# Patient Record
Sex: MALE | Race: WHITE | NOT HISPANIC OR LATINO | Employment: OTHER | URBAN - NONMETROPOLITAN AREA
[De-identification: names, ages, dates, MRNs, and addresses within clinical notes are randomized per-mention and may not be internally consistent; named-entity substitution may affect disease eponyms.]

---

## 2017-01-16 ENCOUNTER — OFFICE VISIT (OUTPATIENT)
Dept: UROLOGY | Facility: CLINIC | Age: 77
End: 2017-01-16

## 2017-01-16 VITALS — HEIGHT: 71 IN | TEMPERATURE: 95.9 F | WEIGHT: 241 LBS | BODY MASS INDEX: 33.74 KG/M2

## 2017-01-16 DIAGNOSIS — N40.1 BPH (BENIGN PROSTATIC HYPERTROPHY) WITH URINARY OBSTRUCTION: Primary | ICD-10-CM

## 2017-01-16 DIAGNOSIS — N28.1 RENAL CYST: ICD-10-CM

## 2017-01-16 DIAGNOSIS — N13.8 BPH (BENIGN PROSTATIC HYPERTROPHY) WITH URINARY OBSTRUCTION: Primary | ICD-10-CM

## 2017-01-16 DIAGNOSIS — Z12.5 ENCOUNTER FOR SCREENING FOR MALIGNANT NEOPLASM OF PROSTATE: ICD-10-CM

## 2017-01-16 LAB
BILIRUB BLD-MCNC: NEGATIVE MG/DL
CLARITY, POC: CLEAR
COLOR UR: YELLOW
GLUCOSE UR STRIP-MCNC: NEGATIVE MG/DL
KETONES UR QL: NEGATIVE
LEUKOCYTE EST, POC: NEGATIVE
NITRITE UR-MCNC: NEGATIVE MG/ML
PH UR: 7 [PH] (ref 5–8)
PROT UR STRIP-MCNC: NEGATIVE MG/DL
RBC # UR STRIP: NEGATIVE /UL
SP GR UR: 1.02 (ref 1–1.03)
UROBILINOGEN UR QL: NORMAL

## 2017-01-16 PROCEDURE — 81003 URINALYSIS AUTO W/O SCOPE: CPT | Performed by: UROLOGY

## 2017-01-16 PROCEDURE — 99213 OFFICE O/P EST LOW 20 MIN: CPT | Performed by: UROLOGY

## 2017-01-16 NOTE — MR AVS SNAPSHOT
Garry Short   1/16/2017 1:10 PM   Office Visit    Dept Phone:  [phone]   Encounter #:  [id number]    Provider:  Phong Arrieta MD   Department:  [facility] UROLOGY                Your Full Care Plan              Your Updated Medication List          This list is accurate as of: 1/16/17  1:43 PM.  Always use your most recent med list.                AMBIEN CR 12.5 MG CR tablet   Generic drug:  zolpidem CR       amLODIPine 10 MG tablet   Commonly known as:  NORVASC       aspirin 81 MG EC tablet       FLUoxetine 20 MG capsule   Commonly known as:  PROzac       hydrochlorothiazide 25 MG tablet   Commonly known as:  HYDRODIURIL       levoFLOXacin 500 MG tablet   Commonly known as:  LEVAQUIN       losartan-hydrochlorothiazide 100-25 MG per tablet   Commonly known as:  HYZAAR       metroNIDAZOLE 500 MG tablet   Commonly known as:  FLAGYL       naproxen 500 MG tablet   Commonly known as:  NAPROSYN       nebivolol 10 MG tablet   Commonly known as:  BYSTOLIC       Potassium 99 MG tablet       rosuvastatin 10 MG tablet   Commonly known as:  CRESTOR       STRESSTABS ENERGY PO       tamsulosin 0.4 MG capsule 24 hr capsule   Commonly known as:  FLOMAX   Take 1 capsule by mouth Every Night.               We Performed the Following     POC Urinalysis Dipstick, Automated       You Were Diagnosed With        Codes Comments    BPH (benign prostatic hypertrophy) with urinary obstruction    -  Primary ICD-10-CM: N40.1, N13.8  ICD-9-CM: 600.01, 599.69     Renal cyst     ICD-10-CM: N28.1  ICD-9-CM: 753.10     Encounter for screening for malignant neoplasm of prostate     ICD-10-CM: Z12.5  ICD-9-CM: V76.44       Instructions     None    Patient Instructions History      Upcoming Appointments     Visit Type Date Time Department    FOLLOW UP 1/16/2017  1:10 PM [facility] UROLOGY [facility]      MyChart Signup     [facility] that [facility]. If you  "would like to sign up for [employer], please email [email or web address] or call [phone] to obtain an activation code.             Other Info from Your Visit           Allergies     Acetaminophen      Tylenol 3    Daypro [Oxaprozin]      Procardia [Nifedipine]      Relafen [Nabumetone]      Septra [Sulfamethoxazole-trimethoprim]      Sulfa Antibiotics      Zestril [Lisinopril]        Reason for Visit     Benign Prostatic Hypertrophy           Vital Signs     Temperature Height Weight Body Mass Index Smoking Status       95.9 °F (35.5 °C) 71\" (180.3 cm) 241 lb (109 kg) 33.61 kg/m2 Never Smoker       Problems and Diagnoses Noted     Enlarged prostate with urinary obstruction    -  Primary    Renal cyst        Screening for prostate cancer          Results     POC Urinalysis Dipstick, Automated      Component Value Standard Range & Units    Color Yellow Yellow, Straw, Dark Yellow, Mojgan    Clarity, UA Clear Clear    Glucose, UA Negative Negative, 1000 mg/dL (3+) mg/dL    Bilirubin Negative Negative    Ketones, UA Negative Negative    Specific Gravity  1.025 1.005 - 1.030    Blood, UA Negative Negative    pH, Urine 7.0 5.0 - 8.0    Protein, POC Negative Negative mg/dL    Urobilinogen, UA Normal Normal    Leukocytes Negative Negative    Nitrite, UA Negative Negative                    "

## 2017-01-16 NOTE — PROGRESS NOTES
Subjective    Mr. Short is 76 y.o. male    Chief Complaint: BPH    History of Present Illness    Benign Prostatic Hypertrophy  Patient complains of lower urinary tract symptoms. He reports frequency, incomplete emptying, intermittency, nocturia two times a night, straining and weak stream. He denies urgency. Patient states symptoms are of mild severity. Onset of symptoms was several months ago and was gradual in onset. His AUA Symptom Score is, 10/35.He reports a history of no complicating symptoms. He denies flank pain, gross hematuria, kidney stones and recurrent UTI.  Patient has tried Alpha blockers with improvement. Last PSA was not drawn .            The following portions of the patient's history were reviewed and updated as appropriate: allergies, current medications, past family history, past medical history, past social history, past surgical history and problem list.    Review of Systems   Constitutional: Negative for chills and fever.   Gastrointestinal: Negative for abdominal pain, anal bleeding and blood in stool.   Genitourinary: Negative for flank pain and hematuria.         Current Outpatient Prescriptions:   •  amLODIPine (NORVASC) 10 MG tablet, Take 10 mg by mouth Daily., Disp: , Rfl:   •  aspirin 81 MG EC tablet, Take 81 mg by mouth Daily., Disp: , Rfl:   •  FLUoxetine (PROzac) 20 MG capsule, Take 20 mg by mouth Daily., Disp: , Rfl:   •  hydrochlorothiazide (HYDRODIURIL) 25 MG tablet, Take 25 mg by mouth Daily., Disp: , Rfl:   •  levoFLOXacin (LEVAQUIN) 500 MG tablet, Take 500 mg by mouth Daily., Disp: , Rfl:   •  losartan-hydrochlorothiazide (HYZAAR) 100-25 MG per tablet, Take 1 tablet by mouth Daily., Disp: , Rfl:   •  metroNIDAZOLE (FLAGYL) 500 MG tablet, Take 500 mg by mouth 2 (Two) Times a Day., Disp: , Rfl:   •  Multiple Vitamin (STRESSTABS ENERGY PO), Take 1 tablet by mouth Daily., Disp: , Rfl:   •  naproxen (NAPROSYN) 500 MG tablet, Take 500 mg by mouth 2 (Two) Times a Day With  "Meals., Disp: , Rfl:   •  nebivolol (BYSTOLIC) 10 MG tablet, Take 10 mg by mouth Daily., Disp: , Rfl:   •  Potassium 99 MG tablet, Take 1 tablet by mouth Daily., Disp: , Rfl:   •  rosuvastatin (CRESTOR) 10 MG tablet, Take 10 mg by mouth Daily., Disp: , Rfl:   •  tamsulosin (FLOMAX) 0.4 MG capsule 24 hr capsule, Take 1 capsule by mouth Every Night., Disp: 90 capsule, Rfl: 3  •  zolpidem CR (AMBIEN CR) 12.5 MG CR tablet, Take 12.5 mg by mouth At Night As Needed for sleep., Disp: , Rfl:     Past Medical History   Diagnosis Date   • Hyperlipidemia    • Hypertension        Past Surgical History   Procedure Laterality Date   • Knee surgery Left    • Carpal tunnel release     • Laminectomy     • Spinal fusion         Social History     Social History   • Marital status:      Spouse name: N/A   • Number of children: N/A   • Years of education: N/A     Social History Main Topics   • Smoking status: Never Smoker   • Smokeless tobacco: None   • Alcohol use Yes      Comment: occ   • Drug use: None   • Sexual activity: Not Asked     Other Topics Concern   • None     Social History Narrative       Family History   Problem Relation Age of Onset   • No Known Problems Father    • No Known Problems Mother        Objective    Visit Vitals   • Temp 95.9 °F (35.5 °C)   • Ht 71\" (180.3 cm)   • Wt 241 lb (109 kg)   • BMI 33.61 kg/m2       Physical Exam        Results for orders placed or performed in visit on 01/16/17   POC Urinalysis Dipstick, Automated   Result Value Ref Range    Color Yellow Yellow, Straw, Dark Yellow, Mojgan    Clarity, UA Clear Clear    Glucose, UA Negative Negative, 1000 mg/dL (3+) mg/dL    Bilirubin Negative Negative    Ketones, UA Negative Negative    Specific Gravity  1.025 1.005 - 1.030    Blood, UA Negative Negative    pH, Urine 7.0 5.0 - 8.0    Protein, POC Negative Negative mg/dL    Urobilinogen, UA Normal Normal    Leukocytes Negative Negative    Nitrite, UA Negative Negative     Assessment and " Veronica Castañeda was seen today for benign prostatic hypertrophy.    Diagnoses and all orders for this visit:    BPH (benign prostatic hypertrophy) with urinary obstruction  -     POC Urinalysis Dipstick, Automated  -     PSA; Future    Renal cyst  -     US Renal Bilateral; Future    Encounter for screening for malignant neoplasm of prostate   -     PSA; Future      Patient much improved on Flomax.  I will keep an eye on his Bosniak one cyst with a renal ultrasound next year.  We will also check a PSA next year.  Continue Flomax.  No need for invasive testing at this point.

## 2017-01-16 NOTE — LETTER
January 16, 2017     Jabier Dubois MD  [address] [facility] [address]    Patient: Garry Short   YOB: 1940   Date of Visit: 1/16/2017       Dear Dr. Silvino MD:    Thank you for referring Garry Short to me for evaluation. Below are the relevant portions of my assessment and plan of care.    If you have questions, please do not hesitate to call me. I look forward to following Garry along with you.         Sincerely,        Phong Arrieta MD        CC: No Recipients  Phong Arrieta MD  1/16/2017  1:43 PM  Signed  Subjective    Mr. Short is 76 y.o. male    Chief Complaint: BPH    History of Present Illness    Benign Prostatic Hypertrophy  Patient complains of lower urinary tract symptoms. He reports frequency, incomplete emptying, intermittency, nocturia two times a night, straining and weak stream. He denies urgency. Patient states symptoms are of mild severity. Onset of symptoms was several months ago and was gradual in onset. His AUA Symptom Score is, 10/35.He reports a history of no complicating symptoms. He denies flank pain, gross hematuria, kidney stones and recurrent UTI.  Patient has tried Alpha blockers with improvement. Last PSA was not drawn .            The following portions of the patient's history were reviewed and updated as appropriate: allergies, current medications, past family history, past medical history, past social history, past surgical history and problem list.    Review of Systems   Constitutional: Negative for chills and fever.   Gastrointestinal: Negative for abdominal pain, anal bleeding and blood in stool.   Genitourinary: Negative for flank pain and hematuria.         Current Outpatient Prescriptions:   •  amLODIPine (NORVASC) 10 MG tablet, Take 10 mg by mouth Daily., Disp: , Rfl:   •  aspirin 81 MG EC tablet, Take 81 mg by mouth Daily., Disp: , Rfl:   •  FLUoxetine (PROzac) 20 MG capsule, Take 20 mg by mouth Daily., Disp: , Rfl:  "  •  hydrochlorothiazide (HYDRODIURIL) 25 MG tablet, Take 25 mg by mouth Daily., Disp: , Rfl:   •  levoFLOXacin (LEVAQUIN) 500 MG tablet, Take 500 mg by mouth Daily., Disp: , Rfl:   •  losartan-hydrochlorothiazide (HYZAAR) 100-25 MG per tablet, Take 1 tablet by mouth Daily., Disp: , Rfl:   •  metroNIDAZOLE (FLAGYL) 500 MG tablet, Take 500 mg by mouth 2 (Two) Times a Day., Disp: , Rfl:   •  Multiple Vitamin (STRESSTABS ENERGY PO), Take 1 tablet by mouth Daily., Disp: , Rfl:   •  naproxen (NAPROSYN) 500 MG tablet, Take 500 mg by mouth 2 (Two) Times a Day With Meals., Disp: , Rfl:   •  nebivolol (BYSTOLIC) 10 MG tablet, Take 10 mg by mouth Daily., Disp: , Rfl:   •  Potassium 99 MG tablet, Take 1 tablet by mouth Daily., Disp: , Rfl:   •  rosuvastatin (CRESTOR) 10 MG tablet, Take 10 mg by mouth Daily., Disp: , Rfl:   •  tamsulosin (FLOMAX) 0.4 MG capsule 24 hr capsule, Take 1 capsule by mouth Every Night., Disp: 90 capsule, Rfl: 3  •  zolpidem CR (AMBIEN CR) 12.5 MG CR tablet, Take 12.5 mg by mouth At Night As Needed for sleep., Disp: , Rfl:     Past Medical History   Diagnosis Date   • Hyperlipidemia    • Hypertension        Past Surgical History   Procedure Laterality Date   • Knee surgery Left    • Carpal tunnel release     • Laminectomy     • Spinal fusion         Social History     Social History   • Marital status:      Spouse name: N/A   • Number of children: N/A   • Years of education: N/A     Social History Main Topics   • Smoking status: Never Smoker   • Smokeless tobacco: None   • Alcohol use Yes      Comment: occ   • Drug use: None   • Sexual activity: Not Asked     Other Topics Concern   • None     Social History Narrative       Family History   Problem Relation Age of Onset   • No Known Problems Father    • No Known Problems Mother        Objective    Visit Vitals   • Temp 95.9 °F (35.5 °C)   • Ht 71\" (180.3 cm)   • Wt 241 lb (109 kg)   • BMI 33.61 kg/m2       Physical Exam        Results for orders " placed or performed in visit on 01/16/17   POC Urinalysis Dipstick, Automated   Result Value Ref Range    Color Yellow Yellow, Straw, Dark Yellow, Mojgan    Clarity, UA Clear Clear    Glucose, UA Negative Negative, 1000 mg/dL (3+) mg/dL    Bilirubin Negative Negative    Ketones, UA Negative Negative    Specific Gravity  1.025 1.005 - 1.030    Blood, UA Negative Negative    pH, Urine 7.0 5.0 - 8.0    Protein, POC Negative Negative mg/dL    Urobilinogen, UA Normal Normal    Leukocytes Negative Negative    Nitrite, UA Negative Negative     Assessment and Plan    Garry was seen today for benign prostatic hypertrophy.    Diagnoses and all orders for this visit:    BPH (benign prostatic hypertrophy) with urinary obstruction  -     POC Urinalysis Dipstick, Automated  -     PSA; Future    Renal cyst  -     US Renal Bilateral; Future    Encounter for screening for malignant neoplasm of prostate   -     PSA; Future      Patient much improved on Flomax.  I will keep an eye on his Bosniak one cyst with a renal ultrasound next year.  We will also check a PSA next year.  Continue Flomax.  No need for invasive testing at this point.

## 2017-12-01 DIAGNOSIS — N13.30 HYDRONEPHROSIS, UNSPECIFIED HYDRONEPHROSIS TYPE: Primary | ICD-10-CM

## 2018-01-11 DIAGNOSIS — N13.8 BENIGN PROSTATIC HYPERPLASIA WITH URINARY OBSTRUCTION: ICD-10-CM

## 2018-01-11 DIAGNOSIS — Z12.5 ENCOUNTER FOR SCREENING FOR MALIGNANT NEOPLASM OF PROSTATE: ICD-10-CM

## 2018-01-11 DIAGNOSIS — N40.1 BENIGN PROSTATIC HYPERPLASIA WITH URINARY OBSTRUCTION: ICD-10-CM

## 2018-01-17 ENCOUNTER — HOSPITAL ENCOUNTER (OUTPATIENT)
Dept: ULTRASOUND IMAGING | Facility: HOSPITAL | Age: 78
Discharge: HOME | End: 2018-01-17
Attending: UROLOGY | Admitting: UROLOGY

## 2018-01-17 ENCOUNTER — OFFICE VISIT (OUTPATIENT)
Dept: UROLOGY | Facility: CLINIC | Age: 78
End: 2018-01-17

## 2018-01-17 VITALS — WEIGHT: 237.8 LBS | TEMPERATURE: 98.7 F | HEIGHT: 71 IN | BODY MASS INDEX: 33.29 KG/M2

## 2018-01-17 DIAGNOSIS — N13.8 BENIGN PROSTATIC HYPERPLASIA WITH URINARY OBSTRUCTION: Primary | ICD-10-CM

## 2018-01-17 DIAGNOSIS — N40.1 BENIGN PROSTATIC HYPERPLASIA WITH URINARY OBSTRUCTION: Primary | ICD-10-CM

## 2018-01-17 DIAGNOSIS — N28.1 RENAL CYST: ICD-10-CM

## 2018-01-17 DIAGNOSIS — N13.30 HYDRONEPHROSIS, UNSPECIFIED HYDRONEPHROSIS TYPE: ICD-10-CM

## 2018-01-17 LAB
BILIRUB BLD-MCNC: NEGATIVE MG/DL
CLARITY, POC: CLEAR
COLOR UR: YELLOW
GLUCOSE UR STRIP-MCNC: NEGATIVE MG/DL
KETONES UR QL: NEGATIVE
LEUKOCYTE EST, POC: NEGATIVE
NITRITE UR-MCNC: NEGATIVE MG/ML
PH UR: 7 [PH] (ref 5–8)
PROT UR STRIP-MCNC: ABNORMAL MG/DL
RBC # UR STRIP: NEGATIVE /UL
SP GR UR: 1.02 (ref 1–1.03)
UROBILINOGEN UR QL: NORMAL

## 2018-01-17 PROCEDURE — 81003 URINALYSIS AUTO W/O SCOPE: CPT | Performed by: UROLOGY

## 2018-01-17 PROCEDURE — 99213 OFFICE O/P EST LOW 20 MIN: CPT | Performed by: UROLOGY

## 2018-01-17 PROCEDURE — 76775 US EXAM ABDO BACK WALL LIM: CPT

## 2018-01-17 RX ORDER — MELOXICAM 15 MG/1
15 TABLET ORAL DAILY
COMMUNITY

## 2018-01-17 RX ORDER — FINASTERIDE 5 MG/1
5 TABLET, FILM COATED ORAL DAILY
COMMUNITY
End: 2019-02-08 | Stop reason: SDUPTHER

## 2018-01-17 NOTE — PROGRESS NOTES
Subjective    Mr. Short is 77 y.o. male    Chief Complaint: BPH    History of Present Illness     Benign Prostatic Hypertrophy  Patient complains of lower urinary tract symptoms. He reports frequency, incomplete emptying, intermittency, nocturia two times a night, straining and weak stream. He denies urgency. Patient states symptoms are of mild severity. Onset of symptoms was several months ago and was gradual in onset. His AUA Symptom Score is, 12/35.He reports a history of no complicating symptoms. He denies flank pain, gross hematuria, kidney stones and recurrent UTI.  Patient has tried Alpha blockers with improvement. Last PSA was not drawn .          The following portions of the patient's history were reviewed and updated as appropriate: allergies, current medications, past family history, past medical history, past social history, past surgical history and problem list.    Review of Systems   Constitutional: Negative for chills and fever.   Gastrointestinal: Negative for abdominal pain, anal bleeding and blood in stool.   Genitourinary: Negative for flank pain and hematuria.         Current Outpatient Prescriptions:   •  amLODIPine (NORVASC) 10 MG tablet, Take 10 mg by mouth Daily., Disp: , Rfl:   •  aspirin 81 MG EC tablet, Take 81 mg by mouth Daily., Disp: , Rfl:   •  finasteride (PROSCAR) 5 MG tablet, Take 5 mg by mouth Daily., Disp: , Rfl:   •  FLUoxetine (PROzac) 20 MG capsule, Take 20 mg by mouth Daily., Disp: , Rfl:   •  hydrochlorothiazide (HYDRODIURIL) 25 MG tablet, Take 25 mg by mouth Daily., Disp: , Rfl:   •  losartan-hydrochlorothiazide (HYZAAR) 100-25 MG per tablet, Take 1 tablet by mouth Daily., Disp: , Rfl:   •  meloxicam (MOBIC) 15 MG tablet, Take 15 mg by mouth Daily., Disp: , Rfl:   •  nebivolol (BYSTOLIC) 10 MG tablet, Take 10 mg by mouth Daily., Disp: , Rfl:   •  Potassium 99 MG tablet, Take 1 tablet by mouth Daily., Disp: , Rfl:   •  zolpidem CR (AMBIEN CR) 12.5 MG CR tablet, Take  "12.5 mg by mouth At Night As Needed for sleep., Disp: , Rfl:     Past Medical History:   Diagnosis Date   • Hyperlipidemia    • Hypertension        Past Surgical History:   Procedure Laterality Date   • CARPAL TUNNEL RELEASE     • KNEE SURGERY Left    • LAMINECTOMY     • SPINAL FUSION         Social History     Social History   • Marital status:      Spouse name: N/A   • Number of children: N/A   • Years of education: N/A     Social History Main Topics   • Smoking status: Never Smoker   • Smokeless tobacco: Never Used   • Alcohol use Yes      Comment: occ   • Drug use: None   • Sexual activity: Not Asked     Other Topics Concern   • None     Social History Narrative       Family History   Problem Relation Age of Onset   • No Known Problems Father    • No Known Problems Mother        Objective    Temp 98.7 °F (37.1 °C)  Ht 180.3 cm (71\")  Wt 108 kg (237 lb 12.8 oz)  BMI 33.17 kg/m2    Physical Exam   Constitutional: He is oriented to person, place, and time. He appears well-developed and well-nourished.   Pulmonary/Chest: Effort normal.   Abdominal: Soft. He exhibits no distension and no mass. There is no tenderness. There is no rebound and no guarding. No hernia.   Genitourinary: Penis normal. Rectal exam shows no mass, no tenderness and anal tone normal. Enlarged: for the age of the patient. Right testis shows no mass, no swelling and no tenderness. Left testis shows no mass, no swelling and no tenderness. No hypospadias. No discharge found.   Genitourinary Comments:  The urethral meatus normal in position without evidence of stricture. Epididymis without mass or tenderness. Vas Deferens is palpably normal.Anus and perineum without mass or tenderness. The prostate is approximately 35 ml. It is Symmetric, with a Soft consistency. There are no nodules present. . The seminal vesicles are Not palpable due to the size of the prostate.     Neurological: He is alert and oriented to person, place, and time. "   Vitals reviewed.           Results for orders placed or performed in visit on 01/17/18   POC Urinalysis Dipstick, Automated   Result Value Ref Range    Color Yellow Yellow, Straw, Dark Yellow, Mojgan    Clarity, UA Clear Clear    Glucose, UA Negative Negative, 1000 mg/dL (3+) mg/dL    Bilirubin Negative Negative    Ketones, UA Negative Negative    Specific Gravity  1.020 1.005 - 1.030    Blood, UA Negative Negative    pH, Urine 7.0 5.0 - 8.0    Protein, POC Trace (A) Negative mg/dL    Urobilinogen, UA Normal Normal    Leukocytes Negative Negative    Nitrite, UA Negative Negative   Renal ultrasound independent review    The renal ultrasound is available for me to review.  Treatment recommendations require an independent review.  This film has been reviewed by the radiologist to determine any non urologic abnormalities that are presents.  However, I very closely inspected the kidneys for size, symmetry, contour, parenchymal thickness, perinephric reaction, presence of calcifications, and intrarenal dilation of the collecting system.       The right kidney appears simple renal cyst    The left kidney appears normal on this ultrasound.  The renal parenchymal is norml in thickness.  There are no solid masses or cysts.  There is no hydronephrosis.  There are no stones.      The bladder appears normal on thisultrsaound.  The bladder appears normal in thickness.  There no masses or stones seen on this exam.         Assessment and Plan    Diagnoses and all orders for this visit:    Benign prostatic hyperplasia with urinary obstruction  -     POC Urinalysis Dipstick, Automated    Renal cyst      Patient's voiding symptoms are controlled with finasteride.  I reviewed his renal cyst is bilateral simple renal cysts we will discontinue imaging evaluation of this.  He will return to see me in 1 year

## 2019-02-07 NOTE — PROGRESS NOTES
Subjective    Mr. Short is 78 y.o. male    Chief Complaint:  BPH    History of Present Illness     Benign Prostatic Hypertrophy  Patient complains of lower urinary tract symptoms. He reports frequency, incomplete emptying, intermittency, nocturia two times a night, straining and weak stream. He denies urgency. Patient states symptoms are of mild severity. Onset of symptoms was several months ago and was gradual in onset. His AUA Symptom Score is, 15/35.He reports a history of no complicating symptoms. He denies flank pain, gross hematuria, kidney stones and recurrent UTI.  Patient has tried Alpha blockers with improvement. Last PSA was not drawn .       The following portions of the patient's history were reviewed and updated as appropriate: allergies, current medications, past family history, past medical history, past social history, past surgical history and problem list.    Review of Systems   Constitutional: Negative for chills and fever.   Gastrointestinal: Negative for abdominal pain, anal bleeding and blood in stool.   Genitourinary: Positive for frequency and urgency. Negative for decreased urine volume, difficulty urinating, discharge, dysuria, enuresis, flank pain, genital sores, hematuria, penile pain, penile swelling, scrotal swelling and testicular pain.         Current Outpatient Medications:   •  amLODIPine (NORVASC) 10 MG tablet, Take 10 mg by mouth Daily., Disp: , Rfl:   •  aspirin 81 MG EC tablet, Take 81 mg by mouth Daily., Disp: , Rfl:   •  finasteride (PROSCAR) 5 MG tablet, Take 5 mg by mouth Daily., Disp: , Rfl:   •  FLUoxetine (PROzac) 20 MG capsule, Take 20 mg by mouth Daily., Disp: , Rfl:   •  hydrochlorothiazide (HYDRODIURIL) 25 MG tablet, Take 25 mg by mouth Daily., Disp: , Rfl:   •  losartan-hydrochlorothiazide (HYZAAR) 100-25 MG per tablet, Take 1 tablet by mouth Daily., Disp: , Rfl:   •  meloxicam (MOBIC) 15 MG tablet, Take 15 mg by mouth Daily., Disp: , Rfl:   •  nebivolol  "(BYSTOLIC) 10 MG tablet, Take 10 mg by mouth Daily., Disp: , Rfl:   •  Potassium 99 MG tablet, Take 1 tablet by mouth Daily., Disp: , Rfl:   •  zolpidem CR (AMBIEN CR) 12.5 MG CR tablet, Take 12.5 mg by mouth At Night As Needed for sleep., Disp: , Rfl:     Past Medical History:   Diagnosis Date   • Hyperlipidemia    • Hypertension        Past Surgical History:   Procedure Laterality Date   • CARPAL TUNNEL RELEASE     • KNEE SURGERY Left    • LAMINECTOMY     • SPINAL FUSION         Social History     Socioeconomic History   • Marital status:      Spouse name: Not on file   • Number of children: Not on file   • Years of education: Not on file   • Highest education level: Not on file   Tobacco Use   • Smoking status: Never Smoker   • Smokeless tobacco: Never Used   Substance and Sexual Activity   • Alcohol use: Yes     Comment: occ       Family History   Problem Relation Age of Onset   • No Known Problems Father    • No Known Problems Mother        Objective    Temp 97 °F (36.1 °C)   Ht 180.3 cm (71\")   Wt 102 kg (225 lb)   BMI 31.38 kg/m²     Physical Exam   Constitutional: He is oriented to person, place, and time. He appears well-developed and well-nourished.   Pulmonary/Chest: Effort normal.   Abdominal: Soft. He exhibits no distension and no mass. There is no tenderness. There is no rebound and no guarding. No hernia.   Genitourinary: Penis normal. Rectal exam shows no mass, no tenderness and anal tone normal. Enlarged: for the age of the patient. Right testis shows no mass, no swelling and no tenderness. Left testis shows no mass, no swelling and no tenderness. No hypospadias. No discharge found.   Genitourinary Comments:  The urethral meatus normal in position without evidence of stricture. Epididymis without mass or tenderness. Vas Deferens is palpably normal.Anus and perineum without mass or tenderness. The prostate is approximately 30 ml. It is Symmetric, with a Soft consistency. There are no " nodules present. . The seminal vesicles are Not palpable due to the size of the prostate.     Neurological: He is alert and oriented to person, place, and time.   Vitals reviewed.          Results for orders placed or performed in visit on 02/08/19   POC Urinalysis Dipstick, Multipro   Result Value Ref Range    Color Yellow Yellow, Straw, Dark Yellow, Mojgan    Clarity, UA Clear Clear    Glucose, UA Negative Negative, 1000 mg/dL (3+) mg/dL    Bilirubin Negative Negative    Ketones, UA Trace (A) Negative    Specific Gravity  1.015 1.005 - 1.030    Blood, UA Negative Negative    pH, Urine 7.0 5.0 - 8.0    Protein, POC Trace (A) Negative mg/dL    Urobilinogen, UA Normal Normal    Nitrite, UA Negative Negative    Leukocytes Negative Negative   Patient's Body mass index is 31.38 kg/m². BMI is above normal parameters. Recommendations include: educational material.    Assessment and Plan    Diagnoses and all orders for this visit:    Benign prostatic hyperplasia with urinary obstruction  -     POC Urinalysis Dipstick, Multipro    Patient's voiding symptoms are controlled with finasteride other than worsening with nocturia.  AUA score 15/35.   We discussed Urolift briefly he is having worsening nocturia I have asked him to get a sleep study because certainly obstructive sleep apnea can cause worsening nocturia.  Preliminary follow-up in 1 year continue finasteride.

## 2019-02-08 ENCOUNTER — OFFICE VISIT (OUTPATIENT)
Dept: UROLOGY | Facility: CLINIC | Age: 79
End: 2019-02-08

## 2019-02-08 VITALS — WEIGHT: 225 LBS | BODY MASS INDEX: 31.5 KG/M2 | HEIGHT: 71 IN | TEMPERATURE: 97 F

## 2019-02-08 DIAGNOSIS — N28.1 RENAL CYST: ICD-10-CM

## 2019-02-08 DIAGNOSIS — R35.1 NOCTURIA: ICD-10-CM

## 2019-02-08 DIAGNOSIS — N13.8 BENIGN PROSTATIC HYPERPLASIA WITH URINARY OBSTRUCTION: Primary | ICD-10-CM

## 2019-02-08 DIAGNOSIS — N40.1 BENIGN PROSTATIC HYPERPLASIA WITH URINARY OBSTRUCTION: Primary | ICD-10-CM

## 2019-02-08 LAB
BILIRUB BLD-MCNC: NEGATIVE MG/DL
CLARITY, POC: CLEAR
COLOR UR: YELLOW
GLUCOSE UR STRIP-MCNC: NEGATIVE MG/DL
KETONES UR QL: ABNORMAL
LEUKOCYTE EST, POC: NEGATIVE
NITRITE UR-MCNC: NEGATIVE MG/ML
PH UR: 7 [PH] (ref 5–8)
PROT UR STRIP-MCNC: ABNORMAL MG/DL
RBC # UR STRIP: NEGATIVE /UL
SP GR UR: 1.01 (ref 1–1.03)
UROBILINOGEN UR QL: NORMAL

## 2019-02-08 PROCEDURE — 99213 OFFICE O/P EST LOW 20 MIN: CPT | Performed by: UROLOGY

## 2019-02-08 PROCEDURE — 81001 URINALYSIS AUTO W/SCOPE: CPT | Performed by: UROLOGY

## 2019-02-08 RX ORDER — FINASTERIDE 5 MG/1
5 TABLET, FILM COATED ORAL DAILY
Qty: 90 TABLET | Refills: 3 | Status: SHIPPED | OUTPATIENT
Start: 2019-02-08 | End: 2021-03-26 | Stop reason: SDUPTHER

## 2019-02-08 NOTE — PATIENT INSTRUCTIONS

## 2020-02-10 NOTE — PROGRESS NOTES
Subjective    Mr. Short is 79 y.o. male    Chief Complaint: BPH    History of Present Illness     Benign Prostatic Hypertrophy  Patient complains of lower urinary tract symptoms. He reports frequency, incomplete emptying, intermittency, nocturia two times a night, straining and weak stream. He denieshematuria. Patient states symptoms are of mild severity. Onset of symptoms was several months ago and was gradual in onset. His AUA Symptom Score is, 16/35.He reports a history of no complicating symptoms. He denies flank pain, gross hematuria, kidney stones and recurrent UTI.  Patient has tried Alpha blockers with improvement. Last PSA was not drawn .       The following portions of the patient's history were reviewed and updated as appropriate: allergies, current medications, past family history, past medical history, past social history, past surgical history and problem list.    Review of Systems   Constitutional: Negative for chills and fever.   Gastrointestinal: Negative for abdominal pain, anal bleeding and blood in stool.   Genitourinary: Positive for difficulty urinating, frequency and urgency. Negative for decreased urine volume, discharge, dysuria, enuresis, flank pain, genital sores, penile pain, penile swelling, scrotal swelling and testicular pain.         Current Outpatient Medications:   •  amLODIPine (NORVASC) 10 MG tablet, Take 10 mg by mouth Daily., Disp: , Rfl:   •  aspirin 81 MG EC tablet, Take 81 mg by mouth Daily., Disp: , Rfl:   •  finasteride (PROSCAR) 5 MG tablet, Take 1 tablet by mouth Daily., Disp: 90 tablet, Rfl: 3  •  FLUoxetine (PROzac) 20 MG capsule, Take 20 mg by mouth Daily., Disp: , Rfl:   •  hydrochlorothiazide (HYDRODIURIL) 25 MG tablet, Take 25 mg by mouth Daily., Disp: , Rfl:   •  losartan-hydrochlorothiazide (HYZAAR) 100-25 MG per tablet, Take 1 tablet by mouth Daily., Disp: , Rfl:   •  meloxicam (MOBIC) 15 MG tablet, Take 15 mg by mouth Daily., Disp: , Rfl:   •  nebivolol  "(BYSTOLIC) 10 MG tablet, Take 10 mg by mouth Daily., Disp: , Rfl:   •  Potassium 99 MG tablet, Take 1 tablet by mouth Daily., Disp: , Rfl:   •  rosuvastatin (CRESTOR) 10 MG tablet, Take 10 mg by mouth., Disp: , Rfl:   •  zolpidem CR (AMBIEN CR) 12.5 MG CR tablet, Take 12.5 mg by mouth At Night As Needed for sleep., Disp: , Rfl:     Past Medical History:   Diagnosis Date   • Hyperlipidemia    • Hypertension        Past Surgical History:   Procedure Laterality Date   • BACK SURGERY     • CARPAL TUNNEL RELEASE     • KNEE SURGERY Left    • LAMINECTOMY     • PARTIAL HIP ARTHROPLASTY     • SPINAL FUSION         Social History     Socioeconomic History   • Marital status:      Spouse name: Not on file   • Number of children: Not on file   • Years of education: Not on file   • Highest education level: Not on file   Tobacco Use   • Smoking status: Never Smoker   • Smokeless tobacco: Never Used   Substance and Sexual Activity   • Alcohol use: Yes     Comment: occ       Family History   Problem Relation Age of Onset   • No Known Problems Father    • No Known Problems Mother        Objective    Temp 97.7 °F (36.5 °C)   Ht 180.3 cm (71\")   Wt 102 kg (225 lb)   BMI 31.38 kg/m²     Physical Exam   Constitutional: He is oriented to person, place, and time. He appears well-developed and well-nourished.   Pulmonary/Chest: Effort normal.   Abdominal: Soft. He exhibits no distension and no mass. There is no tenderness. There is no rebound and no guarding. No hernia.   Genitourinary: Rectal exam shows no mass, no tenderness and anal tone normal. Enlarged: for the age of the patient. Right testis shows no mass, no swelling and no tenderness. Left testis shows no mass, no swelling and no tenderness. No hypospadias. No discharge found.   Genitourinary Comments: .Anus and perineum without mass or tenderness. The prostate is approximately 30 ml. It is Symmetric, with a Soft consistency. There are no nodules present. . The seminal " vesicles are Not palpable due to the size of the prostate.     Neurological: He is alert and oriented to person, place, and time.   Vitals reviewed.          Results for orders placed or performed in visit on 02/12/20   POC Urinalysis Dipstick, Multipro   Result Value Ref Range    Color Yellow Yellow, Straw, Dark Yellow, Mojgan    Clarity, UA Clear Clear    Glucose, UA Negative Negative, 1000 mg/dL (3+) mg/dL    Bilirubin Negative Negative    Ketones, UA Trace (A) Negative    Specific Gravity  1.020 1.005 - 1.030    Blood, UA Negative Negative    pH, Urine 6.5 5.0 - 8.0    Protein, POC 30 mg/dL (A) Negative mg/dL    Urobilinogen, UA Normal Normal    Nitrite, UA Negative Negative    Leukocytes Negative Negative     Assessment and Plan    Diagnoses and all orders for this visit:    Benign prostatic hyperplasia with urinary obstruction  -     POC Urinalysis Dipstick, Multipro    Renal cyst    Nocturia    Impotence of organic origin         Patient's voiding symptoms are controlled with finasteride other than worsening with nocturia.  AUA score 16/35.        Patient will continue finasteride.  He will follow-up in 1 year.

## 2020-02-12 ENCOUNTER — OFFICE VISIT (OUTPATIENT)
Dept: UROLOGY | Facility: CLINIC | Age: 80
End: 2020-02-12

## 2020-02-12 VITALS — WEIGHT: 225 LBS | HEIGHT: 71 IN | TEMPERATURE: 97.7 F | BODY MASS INDEX: 31.5 KG/M2

## 2020-02-12 DIAGNOSIS — N28.1 RENAL CYST: ICD-10-CM

## 2020-02-12 DIAGNOSIS — N40.1 BENIGN PROSTATIC HYPERPLASIA WITH URINARY OBSTRUCTION: Primary | ICD-10-CM

## 2020-02-12 DIAGNOSIS — N13.8 BENIGN PROSTATIC HYPERPLASIA WITH URINARY OBSTRUCTION: Primary | ICD-10-CM

## 2020-02-12 DIAGNOSIS — N52.9 IMPOTENCE OF ORGANIC ORIGIN: ICD-10-CM

## 2020-02-12 DIAGNOSIS — R35.1 NOCTURIA: ICD-10-CM

## 2020-02-12 LAB
BILIRUB BLD-MCNC: NEGATIVE MG/DL
CLARITY, POC: CLEAR
COLOR UR: YELLOW
GLUCOSE UR STRIP-MCNC: NEGATIVE MG/DL
KETONES UR QL: ABNORMAL
LEUKOCYTE EST, POC: NEGATIVE
NITRITE UR-MCNC: NEGATIVE MG/ML
PH UR: 6.5 [PH] (ref 5–8)
PROT UR STRIP-MCNC: ABNORMAL MG/DL
RBC # UR STRIP: NEGATIVE /UL
SP GR UR: 1.02 (ref 1–1.03)
UROBILINOGEN UR QL: NORMAL

## 2020-02-12 PROCEDURE — 99213 OFFICE O/P EST LOW 20 MIN: CPT | Performed by: UROLOGY

## 2020-02-12 PROCEDURE — 81003 URINALYSIS AUTO W/O SCOPE: CPT | Performed by: UROLOGY

## 2020-02-12 RX ORDER — ROSUVASTATIN CALCIUM 10 MG/1
10 TABLET, COATED ORAL
COMMUNITY

## 2021-03-23 NOTE — PROGRESS NOTES
Subjective    Mr. Short is 80 y.o. male    Chief Complaint: BPH.    History of Present Illness  Benign Prostatic Hypertrophy  Patient complains of lower urinary tract symptoms. He reports frequency, incomplete emptying, intermittency, nocturia two times a night, straining and weak stream. He denieshematuria. Patient states symptoms are of mild severity. Onset of symptoms was several months ago and was gradual in onset. His AUA Symptom Score is, 22/35.He reports a history of no complicating symptoms. He denies flank pain, gross hematuria, kidney stones and recurrent UTI.  Patient has tried Alpha blockers with improvement. Last PSA was not drawn .    The following portions of the patient's history were reviewed and updated as appropriate: allergies, current medications, past family history, past medical history, past social history, past surgical history and problem list.    Review of Systems   Constitutional: Negative for chills and fever.   Gastrointestinal: Negative for abdominal pain, anal bleeding and blood in stool.   Genitourinary: Negative for dysuria and hematuria.         Current Outpatient Medications:   •  amLODIPine (NORVASC) 10 MG tablet, Take 10 mg by mouth Daily., Disp: , Rfl:   •  aspirin 81 MG EC tablet, Take 81 mg by mouth Daily., Disp: , Rfl:   •  finasteride (PROSCAR) 5 MG tablet, Take 1 tablet by mouth Daily., Disp: 90 tablet, Rfl: 3  •  FLUoxetine (PROzac) 20 MG capsule, Take 20 mg by mouth Daily., Disp: , Rfl:   •  losartan-hydrochlorothiazide (HYZAAR) 100-25 MG per tablet, Take 1 tablet by mouth Daily., Disp: , Rfl:   •  meloxicam (MOBIC) 15 MG tablet, Take 15 mg by mouth Daily., Disp: , Rfl:   •  nebivolol (BYSTOLIC) 10 MG tablet, Take 10 mg by mouth Daily., Disp: , Rfl:   •  Potassium 99 MG tablet, Take 1 tablet by mouth Daily., Disp: , Rfl:   •  rosuvastatin (CRESTOR) 10 MG tablet, Take 10 mg by mouth., Disp: , Rfl:   •  zolpidem CR (AMBIEN CR) 12.5 MG CR tablet, Take 12.5 mg by mouth  "At Night As Needed for sleep., Disp: , Rfl:   •  hydrochlorothiazide (HYDRODIURIL) 25 MG tablet, Take 25 mg by mouth Daily., Disp: , Rfl:   •  tadalafil (CIALIS) 20 MG tablet, Take 1 tablet by mouth Daily As Needed for Erectile Dysfunction., Disp: 10 tablet, Rfl: 11    Past Medical History:   Diagnosis Date   • Hyperlipidemia    • Hypertension        Past Surgical History:   Procedure Laterality Date   • BACK SURGERY     • CARPAL TUNNEL RELEASE     • KNEE SURGERY Left    • LAMINECTOMY     • PARTIAL HIP ARTHROPLASTY     • SPINAL CORD STIMULATOR IMPLANT     • SPINAL FUSION         Social History     Socioeconomic History   • Marital status:      Spouse name: Not on file   • Number of children: Not on file   • Years of education: Not on file   • Highest education level: Not on file   Tobacco Use   • Smoking status: Never Smoker   • Smokeless tobacco: Never Used   Vaping Use   • Vaping Use: Never used   Substance and Sexual Activity   • Alcohol use: Yes     Comment: occ   • Sexual activity: Defer       Family History   Problem Relation Age of Onset   • No Known Problems Father    • No Known Problems Mother        Objective    Temp 96.6 °F (35.9 °C)   Ht 180.3 cm (71\")   Wt 99.5 kg (219 lb 6.4 oz)   BMI 30.60 kg/m²     Physical Exam  Vitals reviewed.   Constitutional:       Appearance: He is well-developed.   Pulmonary:      Effort: Pulmonary effort is normal.   Abdominal:      General: There is no distension.      Palpations: Abdomen is soft. There is no mass.      Tenderness: There is no abdominal tenderness. There is no guarding or rebound.      Hernia: No hernia is present.   Genitourinary:     Penis: Normal. No hypospadias or discharge.       Testes:         Right: Mass, tenderness or swelling not present.         Left: Mass, tenderness or swelling not present.      Prostate: Enlarged: for the age of the patient.      Rectum: No mass or tenderness. Normal anal tone.      Comments:  The urethral meatus " normal in position without evidence of stricture. Epididymis without mass or tenderness. Vas Deferens is palpably normal.Anus and perineum without mass or tenderness. The prostate is approximately 50 ml. It is Symmetric, with a Soft consistency. There are no nodules present. . The seminal vesicles are Not palpable due to the size of the prostate.    Neurological:      Mental Status: He is alert and oriented to person, place, and time.             Results for orders placed or performed in visit on 03/26/21   POC Urinalysis Dipstick, Multipro    Specimen: Urine   Result Value Ref Range    Color Yellow Yellow, Straw, Dark Yellow, Mojgan    Clarity, UA Clear Clear    Glucose, UA Negative Negative, 1000 mg/dL (3+) mg/dL    Bilirubin Negative Negative    Ketones, UA Negative Negative    Specific Gravity  1.020 1.005 - 1.030    Blood, UA Trace (A) Negative    pH, Urine 7.0 5.0 - 8.0    Protein, POC Negative Negative mg/dL    Urobilinogen, UA Normal Normal    Nitrite, UA Negative Negative    Leukocytes Negative Negative     Bladder Scan interpretation  Estimation of residual urine via abdominal ultrasound  Residual Urine: 62ml  Indication: BPH  Position: Supine  Examination: Incremental scanning of the suprapubic area using 3 MHz transducer using copious amounts of acoustic gel.   Findings: An anechoic area was demonstrated which represented the bladder, with measurement of residual urine as noted. I inspected this myself. In that the residual urine was stable or insignificant, no treatment will be necessary at this time.       Assessment and Plan    Diagnoses and all orders for this visit:    1. Benign prostatic hyperplasia with urinary obstruction (Primary)  -     POC Urinalysis Dipstick, Multipro  -     Discontinue: finasteride (PROSCAR) 5 MG tablet; Take 1 tablet by mouth Daily.  Dispense: 90 tablet; Refill: 3  -     finasteride (PROSCAR) 5 MG tablet; Take 1 tablet by mouth Daily.  Dispense: 90 tablet; Refill: 3    2.  Impotence of organic origin  -     tadalafil (CIALIS) 20 MG tablet; Take 1 tablet by mouth Daily As Needed for Erectile Dysfunction.  Dispense: 10 tablet; Refill: 11    3. Renal cyst    Patient's voiding symptoms are controlled with finasteride.  AUA score 22/35.    Continue this medication     Cialis for ED.    Follow up in one year.

## 2021-03-26 ENCOUNTER — OFFICE VISIT (OUTPATIENT)
Dept: UROLOGY | Facility: CLINIC | Age: 81
End: 2021-03-26

## 2021-03-26 VITALS — WEIGHT: 219.4 LBS | TEMPERATURE: 96.6 F | HEIGHT: 71 IN | BODY MASS INDEX: 30.72 KG/M2

## 2021-03-26 DIAGNOSIS — N28.1 RENAL CYST: ICD-10-CM

## 2021-03-26 DIAGNOSIS — N13.8 BENIGN PROSTATIC HYPERPLASIA WITH URINARY OBSTRUCTION: Primary | ICD-10-CM

## 2021-03-26 DIAGNOSIS — N52.9 IMPOTENCE OF ORGANIC ORIGIN: ICD-10-CM

## 2021-03-26 DIAGNOSIS — N40.1 BENIGN PROSTATIC HYPERPLASIA WITH URINARY OBSTRUCTION: Primary | ICD-10-CM

## 2021-03-26 LAB
BILIRUB BLD-MCNC: NEGATIVE MG/DL
CLARITY, POC: CLEAR
COLOR UR: YELLOW
GLUCOSE UR STRIP-MCNC: NEGATIVE MG/DL
KETONES UR QL: NEGATIVE
LEUKOCYTE EST, POC: NEGATIVE
NITRITE UR-MCNC: NEGATIVE MG/ML
PH UR: 7 [PH] (ref 5–8)
PROT UR STRIP-MCNC: NEGATIVE MG/DL
RBC # UR STRIP: ABNORMAL /UL
SP GR UR: 1.02 (ref 1–1.03)
UROBILINOGEN UR QL: NORMAL

## 2021-03-26 PROCEDURE — 99214 OFFICE O/P EST MOD 30 MIN: CPT | Performed by: UROLOGY

## 2021-03-26 PROCEDURE — 81003 URINALYSIS AUTO W/O SCOPE: CPT | Performed by: UROLOGY

## 2021-03-26 PROCEDURE — 51798 US URINE CAPACITY MEASURE: CPT | Performed by: UROLOGY

## 2021-03-26 RX ORDER — FINASTERIDE 5 MG/1
5 TABLET, FILM COATED ORAL DAILY
Qty: 90 TABLET | Refills: 3 | Status: ON HOLD | OUTPATIENT
Start: 2021-03-26 | End: 2022-04-26

## 2021-03-26 RX ORDER — FINASTERIDE 5 MG/1
5 TABLET, FILM COATED ORAL DAILY
Qty: 90 TABLET | Refills: 3 | Status: SHIPPED | OUTPATIENT
Start: 2021-03-26 | End: 2021-03-26 | Stop reason: SDUPTHER

## 2021-03-26 RX ORDER — TADALAFIL 20 MG/1
20 TABLET ORAL DAILY PRN
Qty: 10 TABLET | Refills: 11 | Status: SHIPPED | OUTPATIENT
Start: 2021-03-26

## 2022-03-28 ENCOUNTER — OFFICE VISIT (OUTPATIENT)
Dept: UROLOGY | Facility: CLINIC | Age: 82
End: 2022-03-28

## 2022-03-28 VITALS — TEMPERATURE: 98.1 F | WEIGHT: 215 LBS | HEIGHT: 71 IN | BODY MASS INDEX: 30.1 KG/M2

## 2022-03-28 DIAGNOSIS — N40.1 BENIGN PROSTATIC HYPERPLASIA WITH URINARY OBSTRUCTION: Primary | ICD-10-CM

## 2022-03-28 DIAGNOSIS — N52.9 IMPOTENCE OF ORGANIC ORIGIN: ICD-10-CM

## 2022-03-28 DIAGNOSIS — N13.8 BENIGN PROSTATIC HYPERPLASIA WITH URINARY OBSTRUCTION: Primary | ICD-10-CM

## 2022-03-28 LAB
BILIRUB BLD-MCNC: NEGATIVE MG/DL
CLARITY, POC: CLEAR
COLOR UR: YELLOW
GLUCOSE UR STRIP-MCNC: NEGATIVE MG/DL
KETONES UR QL: NEGATIVE
LEUKOCYTE EST, POC: NEGATIVE
NITRITE UR-MCNC: NEGATIVE MG/ML
PH UR: 7 [PH] (ref 5–8)
PROT UR STRIP-MCNC: ABNORMAL MG/DL
RBC # UR STRIP: NEGATIVE /UL
SP GR UR: 1.01 (ref 1–1.03)
UROBILINOGEN UR QL: NORMAL

## 2022-03-28 PROCEDURE — 99214 OFFICE O/P EST MOD 30 MIN: CPT | Performed by: UROLOGY

## 2022-03-28 PROCEDURE — 81003 URINALYSIS AUTO W/O SCOPE: CPT | Performed by: UROLOGY

## 2022-03-28 RX ORDER — CYANOCOBALAMIN 1000 UG/ML
1000 INJECTION, SOLUTION INTRAMUSCULAR; SUBCUTANEOUS
COMMUNITY
Start: 2022-03-12

## 2022-04-11 ENCOUNTER — PROCEDURE VISIT (OUTPATIENT)
Dept: UROLOGY | Facility: CLINIC | Age: 82
End: 2022-04-11

## 2022-04-11 DIAGNOSIS — N13.8 BENIGN PROSTATIC HYPERPLASIA WITH URINARY OBSTRUCTION: Primary | ICD-10-CM

## 2022-04-11 DIAGNOSIS — N40.1 BENIGN PROSTATIC HYPERPLASIA WITH URINARY OBSTRUCTION: Primary | ICD-10-CM

## 2022-04-11 LAB
BILIRUB BLD-MCNC: NEGATIVE MG/DL
CLARITY, POC: CLEAR
COLOR UR: YELLOW
GLUCOSE UR STRIP-MCNC: NEGATIVE MG/DL
KETONES UR QL: NEGATIVE
LEUKOCYTE EST, POC: NEGATIVE
NITRITE UR-MCNC: NEGATIVE MG/ML
PH UR: 7 [PH] (ref 5–8)
PROT UR STRIP-MCNC: NEGATIVE MG/DL
RBC # UR STRIP: NEGATIVE /UL
SP GR UR: 1.01 (ref 1–1.03)
UROBILINOGEN UR QL: ABNORMAL

## 2022-04-11 PROCEDURE — 52000 CYSTOURETHROSCOPY: CPT | Performed by: UROLOGY

## 2022-04-11 PROCEDURE — 99214 OFFICE O/P EST MOD 30 MIN: CPT | Performed by: UROLOGY

## 2022-04-11 PROCEDURE — 81003 URINALYSIS AUTO W/O SCOPE: CPT | Performed by: UROLOGY

## 2022-04-11 RX ORDER — SODIUM CHLORIDE 9 MG/ML
100 INJECTION, SOLUTION INTRAVENOUS CONTINUOUS
Status: CANCELLED | OUTPATIENT
Start: 2022-04-11

## 2022-04-11 NOTE — PROGRESS NOTES
Subjective    Mr. Short is 81 y.o. male    Chief Complaint: BPH    History of Present Illness  Benign Prostatic Hypertrophy  Patient complains of lower urinary tract symptoms. He reports frequency, incomplete emptying, intermittency, nocturia two times a night, straining and weak stream. He denieshematuria. Patient states symptoms are of mild severity. Onset of symptoms was several months ago and was gradual in onset. His AUA Symptom Score is, 23/35.He reports a history of no complicating symptoms. Patient has tried Alpha blockers with improvement. On Finasteride.  The following portions of the patient's history were reviewed and updated as appropriate: allergies, current medications, past family history, past medical history, past social history, past surgical history and problem list.    Review of Systems   Constitutional: Negative for chills and fever.   Gastrointestinal: Negative for abdominal pain, anal bleeding and blood in stool.   Genitourinary: Negative for dysuria and hematuria.         Current Outpatient Medications:   •  amLODIPine (NORVASC) 10 MG tablet, Take 10 mg by mouth Daily., Disp: , Rfl:   •  aspirin 81 MG EC tablet, Take 81 mg by mouth Daily., Disp: , Rfl:   •  cyanocobalamin 1000 MCG/ML injection, , Disp: , Rfl:   •  finasteride (PROSCAR) 5 MG tablet, Take 1 tablet by mouth Daily., Disp: 90 tablet, Rfl: 3  •  FLUoxetine (PROzac) 20 MG capsule, Take 20 mg by mouth Daily., Disp: , Rfl:   •  hydrochlorothiazide (HYDRODIURIL) 25 MG tablet, Take 25 mg by mouth Daily., Disp: , Rfl:   •  losartan-hydrochlorothiazide (HYZAAR) 100-25 MG per tablet, Take 1 tablet by mouth Daily., Disp: , Rfl:   •  meloxicam (MOBIC) 15 MG tablet, Take 15 mg by mouth Daily., Disp: , Rfl:   •  nebivolol (BYSTOLIC) 10 MG tablet, Take 10 mg by mouth Daily., Disp: , Rfl:   •  Potassium 99 MG tablet, Take 1 tablet by mouth Daily., Disp: , Rfl:   •  rosuvastatin (CRESTOR) 10 MG tablet, Take 10 mg by mouth., Disp: , Rfl:   •   tadalafil (CIALIS) 20 MG tablet, Take 1 tablet by mouth Daily As Needed for Erectile Dysfunction., Disp: 10 tablet, Rfl: 11  •  zolpidem CR (AMBIEN CR) 12.5 MG CR tablet, Take 12.5 mg by mouth At Night As Needed for sleep., Disp: , Rfl:     Past Medical History:   Diagnosis Date   • Hyperlipidemia    • Hypertension        Past Surgical History:   Procedure Laterality Date   • BACK SURGERY     • CARPAL TUNNEL RELEASE     • KNEE SURGERY Left    • LAMINECTOMY     • PARTIAL HIP ARTHROPLASTY     • SPINAL CORD STIMULATOR IMPLANT     • SPINAL FUSION         Social History     Socioeconomic History   • Marital status:    Tobacco Use   • Smoking status: Never Smoker   • Smokeless tobacco: Never Used   Vaping Use   • Vaping Use: Never used   Substance and Sexual Activity   • Alcohol use: Yes     Comment: occ   • Sexual activity: Defer       Family History   Problem Relation Age of Onset   • No Known Problems Father    • No Known Problems Mother        Objective    There were no vitals taken for this visit.    Physical Exam        Results for orders placed or performed in visit on 04/11/22   POC Urinalysis Dipstick, Multipro    Specimen: Urine   Result Value Ref Range    Color Yellow Yellow, Straw, Dark Yellow, Mojgan    Clarity, UA Clear Clear    Glucose, UA Negative Negative, 1000 mg/dL (3+) mg/dL    Bilirubin Negative Negative    Ketones, UA Negative Negative    Specific Gravity  1.015 1.005 - 1.030    Blood, UA Negative Negative    pH, Urine 7.0 5.0 - 8.0    Protein, POC Negative Negative mg/dL    Urobilinogen, UA 1 E.U./dL  (A) Normal    Nitrite, UA Negative Negative    Leukocytes Negative Negative     Assessment and Plan    Diagnoses and all orders for this visit:    1. Benign prostatic hyperplasia with urinary obstruction (Primary)  -     POC Urinalysis Dipstick, Multipro      Pre- operative diagnosis:  Benign prostatic hypertrophy    Post operative diagnosis:  Same    Procedure:  The patient was prepped and  draped in a normal sterile fashion.  The urethra was anesthetized with 2% lidocaine jelly.  A flexible cystoscope was introduced per urethra.      Urethra:  Normal    Bladder:  mild trabeculation    Ureteral orifices:  Normal position bilaterally and Clear efflux bilaterally    Prostate:  lateral lobe hypertrophy and Large intravesical lobe     Patient tolerated the procedure well    Complications: none    Blood loss: minimal    Follow up:    Schedule for OR  TURP      Cystoscopy showed lateral lobe hypertrophy the prostate as well as a significant intravesical portion.  He is failed medical therapy.  Discussed options including minimally invasive options.  I do not think this would be of benefit to him we will proceed with TURP in the near future.  Discussion of this resulted in significant evaluation management in addition to the cystoscopy performed today.

## 2022-04-25 ENCOUNTER — PRE-ADMISSION TESTING (OUTPATIENT)
Dept: PREADMISSION TESTING | Facility: HOSPITAL | Age: 82
End: 2022-04-25

## 2022-04-25 ENCOUNTER — LAB (OUTPATIENT)
Dept: LAB | Facility: HOSPITAL | Age: 82
End: 2022-04-25

## 2022-04-25 VITALS
RESPIRATION RATE: 18 BRPM | SYSTOLIC BLOOD PRESSURE: 137 MMHG | DIASTOLIC BLOOD PRESSURE: 57 MMHG | HEART RATE: 71 BPM | OXYGEN SATURATION: 98 % | BODY MASS INDEX: 31.11 KG/M2 | HEIGHT: 71 IN | WEIGHT: 222.22 LBS

## 2022-04-25 DIAGNOSIS — N40.1 BENIGN PROSTATIC HYPERPLASIA WITH URINARY OBSTRUCTION: ICD-10-CM

## 2022-04-25 DIAGNOSIS — N13.8 BENIGN PROSTATIC HYPERPLASIA WITH URINARY OBSTRUCTION: ICD-10-CM

## 2022-04-25 LAB
ANION GAP SERPL CALCULATED.3IONS-SCNC: 13 MMOL/L (ref 5–15)
BUN SERPL-MCNC: 20 MG/DL (ref 8–23)
BUN/CREAT SERPL: 20.8 (ref 7–25)
CALCIUM SPEC-SCNC: 9.5 MG/DL (ref 8.6–10.5)
CHLORIDE SERPL-SCNC: 92 MMOL/L (ref 98–107)
CO2 SERPL-SCNC: 25 MMOL/L (ref 22–29)
CREAT SERPL-MCNC: 0.96 MG/DL (ref 0.76–1.27)
DEPRECATED RDW RBC AUTO: 45.7 FL (ref 37–54)
EGFRCR SERPLBLD CKD-EPI 2021: 80.4 ML/MIN/1.73
ERYTHROCYTE [DISTWIDTH] IN BLOOD BY AUTOMATED COUNT: 12.9 % (ref 12.3–15.4)
GLUCOSE SERPL-MCNC: 113 MG/DL (ref 65–99)
HCT VFR BLD AUTO: 36.1 % (ref 37.5–51)
HGB BLD-MCNC: 12.7 G/DL (ref 13–17.7)
MCH RBC QN AUTO: 34.1 PG (ref 26.6–33)
MCHC RBC AUTO-ENTMCNC: 35.2 G/DL (ref 31.5–35.7)
MCV RBC AUTO: 97 FL (ref 79–97)
PLATELET # BLD AUTO: 214 10*3/MM3 (ref 140–450)
PMV BLD AUTO: 9.5 FL (ref 6–12)
POTASSIUM SERPL-SCNC: 3.5 MMOL/L (ref 3.5–5.2)
RBC # BLD AUTO: 3.72 10*6/MM3 (ref 4.14–5.8)
SARS-COV-2 ORF1AB RESP QL NAA+PROBE: NOT DETECTED
SODIUM SERPL-SCNC: 130 MMOL/L (ref 136–145)
WBC NRBC COR # BLD: 5.74 10*3/MM3 (ref 3.4–10.8)

## 2022-04-25 PROCEDURE — 36415 COLL VENOUS BLD VENIPUNCTURE: CPT

## 2022-04-25 PROCEDURE — U0004 COV-19 TEST NON-CDC HGH THRU: HCPCS

## 2022-04-25 PROCEDURE — 93010 ELECTROCARDIOGRAM REPORT: CPT | Performed by: EMERGENCY MEDICINE

## 2022-04-25 PROCEDURE — U0005 INFEC AGEN DETEC AMPLI PROBE: HCPCS

## 2022-04-25 PROCEDURE — 85027 COMPLETE CBC AUTOMATED: CPT

## 2022-04-25 PROCEDURE — 93005 ELECTROCARDIOGRAM TRACING: CPT

## 2022-04-25 PROCEDURE — 80048 BASIC METABOLIC PNL TOTAL CA: CPT

## 2022-04-25 PROCEDURE — C9803 HOPD COVID-19 SPEC COLLECT: HCPCS

## 2022-04-25 RX ORDER — LOSARTAN POTASSIUM 50 MG/1
100 TABLET ORAL DAILY
COMMUNITY

## 2022-04-25 RX ORDER — MULTIVIT-MIN/IRON/FOLIC ACID/K 18-600-40
1 CAPSULE ORAL DAILY
COMMUNITY

## 2022-04-25 RX ORDER — CHOLECALCIFEROL (VITAMIN D3) 125 MCG
10 CAPSULE ORAL NIGHTLY
COMMUNITY

## 2022-04-25 NOTE — DISCHARGE INSTRUCTIONS
Before you come to the hospital        Arrival time: AS DIRECTED BY OFFICE     PLEASE TAKE NEBIVOLOL THE MORNING OF SURGERY WITH A SIP OF WATER.    DO NOT TAKE HYZAAR FOR 24 HOURS PRIOR TO YOUR SURGERY     ALL OTHER HOME MEDICATION CHECK WITH YOUR PHYSICIAN (especially if you are taking diabetes medicines or blood thinners)    Do not take any Erectile Dysfunction medications (EX: CIALIS, VIAGRA) 24 hours prior to surgery      If you were given and instructed to use a germ- killing soap, use as directed the night before surgery and the morning of surgery before coming to the hospital.       Eating and drinking restrictions  (It is extremely important that you follow these guidelines to prevent delay or cancelation of their procedure)   Up to 2 hours prior to the time you are told to arrive to the hospital - you may continue to drink clear liquids, such as water, clear fruit juice (no pulp), black coffee, and plain tea.          Eating and drinking restrictions prior to scheduled arrival time  Clear liquids (water, apple juice-no pulp)                       2 hours   Breast milk                           4 hours   Milk or drinks that contain milk, full liquids           6 hours   Light meals or foods, such as toast or cereal                6 hours   Heavy foods, such as meat, fried foods or fatty foods   8 hours       Clear Liquids  Water and flavored water                                                                       Sugar water.  Ice or frozen ice pops.  Clear Fruit juices, such as cranberry juice and apple juice.  Black coffee.  Plain tea  Clear bouillon or broth.  Broth-based soups that have been strained.  Flavored gelatin.  Soda.  Gatorade or Powerade.  Full liquid examples  Juices that have pulp.  Frozen ice pops that contain fruit pieces.  Coffee with creamer  Milk.  Cream or cream-based soups.  Yogurt.              MANAGING PAIN AFTER SURGERY    We know you are probably wondering what your pain will  be like after surgery.  Following surgery it is unrealistic to expect you will not have pain.   Pain is how our bodies let us know that something is wrong or cautions us to be careful.  That said, our goal is to make your pain tolerable.    Methods we may use to treat your pain include (oral or IV medications, PCAs, epidurals, nerve blocks, etc.)   While some procedures require IV pain medications for a short time after surgery, transitioning to pain medications by mouth allows for better management of pain.   Your nurse will encourage you to take oral pain medications whenever possible.  IV medications work almost immediately, but only last a short while.  Taking medications by mouth allows for a more constant level of medication in your blood stream for a longer period of time.      Once your pain is out of control it is harder to get back under control.  It is important you are aware when your next dose of pain medication is due.  If you are admitted, your nurse may write the time of your next dose on the white board in your room to help you remember.      We are interested in your pain and encourage you to inform us about aggravating factors during your visit.   Many times a simple repositioning every few hours can make a big difference.    If your physician says it is okay, do not let your pain prevent you from getting out of bed. Be sure to call your nurse for assistance prior to getting up so you do not fall.      Before surgery, please decide your tolerable pain goal.  These faces help describe the pain ratings we use on a 0-10 scale.   Be prepared to tell us your goal and whether or not you take pain or anxiety medications at home.

## 2022-04-26 ENCOUNTER — HOSPITAL ENCOUNTER (OUTPATIENT)
Facility: HOSPITAL | Age: 82
Discharge: HOME | End: 2022-04-27
Attending: UROLOGY | Admitting: UROLOGY

## 2022-04-26 ENCOUNTER — ANESTHESIA EVENT (OUTPATIENT)
Dept: PERIOP | Facility: HOSPITAL | Age: 82
End: 2022-04-26

## 2022-04-26 ENCOUNTER — ANESTHESIA (OUTPATIENT)
Dept: PERIOP | Facility: HOSPITAL | Age: 82
End: 2022-04-26

## 2022-04-26 DIAGNOSIS — N13.8 BENIGN PROSTATIC HYPERPLASIA WITH URINARY OBSTRUCTION: ICD-10-CM

## 2022-04-26 DIAGNOSIS — N40.1 BENIGN PROSTATIC HYPERPLASIA WITH URINARY OBSTRUCTION: ICD-10-CM

## 2022-04-26 LAB
QT INTERVAL: 404 MS
QTC INTERVAL: 423 MS

## 2022-04-26 PROCEDURE — 25010000002 DEXAMETHASONE PER 1 MG: Performed by: NURSE ANESTHETIST, CERTIFIED REGISTERED

## 2022-04-26 PROCEDURE — 25010000002 HYDROMORPHONE PER 4 MG: Performed by: ANESTHESIOLOGY

## 2022-04-26 PROCEDURE — 25010000002 FENTANYL CITRATE (PF) 100 MCG/2ML SOLUTION: Performed by: NURSE ANESTHETIST, CERTIFIED REGISTERED

## 2022-04-26 PROCEDURE — G0378 HOSPITAL OBSERVATION PER HR: HCPCS

## 2022-04-26 PROCEDURE — 52601 PROSTATECTOMY (TURP): CPT | Performed by: UROLOGY

## 2022-04-26 PROCEDURE — 25010000002 CEFAZOLIN PER 500 MG: Performed by: UROLOGY

## 2022-04-26 PROCEDURE — 25010000002 FENTANYL CITRATE (PF) 50 MCG/ML SOLUTION: Performed by: ANESTHESIOLOGY

## 2022-04-26 PROCEDURE — 63710000001 OXYCODONE-ACETAMINOPHEN 10-325 MG TABLET: Performed by: ANESTHESIOLOGY

## 2022-04-26 PROCEDURE — 63710000001 NEBIVOLOL 5 MG TABLET: Performed by: UROLOGY

## 2022-04-26 PROCEDURE — A9270 NON-COVERED ITEM OR SERVICE: HCPCS | Performed by: UROLOGY

## 2022-04-26 PROCEDURE — 63710000001 OXYCODONE-ACETAMINOPHEN 5-325 MG TABLET: Performed by: UROLOGY

## 2022-04-26 PROCEDURE — 25010000002 PROPOFOL 10 MG/ML EMULSION: Performed by: NURSE ANESTHETIST, CERTIFIED REGISTERED

## 2022-04-26 PROCEDURE — 88305 TISSUE EXAM BY PATHOLOGIST: CPT | Performed by: UROLOGY

## 2022-04-26 PROCEDURE — 25010000002 ONDANSETRON PER 1 MG: Performed by: NURSE ANESTHETIST, CERTIFIED REGISTERED

## 2022-04-26 PROCEDURE — A9270 NON-COVERED ITEM OR SERVICE: HCPCS | Performed by: ANESTHESIOLOGY

## 2022-04-26 PROCEDURE — 63710000001 OXYCODONE-ACETAMINOPHEN 10-325 MG TABLET: Performed by: UROLOGY

## 2022-04-26 PROCEDURE — 63710000001 FLUOXETINE 20 MG CAPSULE: Performed by: UROLOGY

## 2022-04-26 RX ORDER — FENTANYL CITRATE 50 UG/ML
25 INJECTION, SOLUTION INTRAMUSCULAR; INTRAVENOUS
Status: DISCONTINUED | OUTPATIENT
Start: 2022-04-26 | End: 2022-04-26 | Stop reason: HOSPADM

## 2022-04-26 RX ORDER — FLUMAZENIL 0.1 MG/ML
0.2 INJECTION INTRAVENOUS AS NEEDED
Status: DISCONTINUED | OUTPATIENT
Start: 2022-04-26 | End: 2022-04-26 | Stop reason: HOSPADM

## 2022-04-26 RX ORDER — LIDOCAINE HYDROCHLORIDE 10 MG/ML
0.5 INJECTION, SOLUTION EPIDURAL; INFILTRATION; INTRACAUDAL; PERINEURAL ONCE AS NEEDED
Status: DISCONTINUED | OUTPATIENT
Start: 2022-04-26 | End: 2022-04-26 | Stop reason: HOSPADM

## 2022-04-26 RX ORDER — FENTANYL CITRATE 50 UG/ML
INJECTION, SOLUTION INTRAMUSCULAR; INTRAVENOUS AS NEEDED
Status: DISCONTINUED | OUTPATIENT
Start: 2022-04-26 | End: 2022-04-26 | Stop reason: SURG

## 2022-04-26 RX ORDER — NEBIVOLOL 5 MG/1
10 TABLET ORAL DAILY
Status: DISCONTINUED | OUTPATIENT
Start: 2022-04-26 | End: 2022-04-27 | Stop reason: HOSPADM

## 2022-04-26 RX ORDER — PROPOFOL 10 MG/ML
VIAL (ML) INTRAVENOUS AS NEEDED
Status: DISCONTINUED | OUTPATIENT
Start: 2022-04-26 | End: 2022-04-26 | Stop reason: SURG

## 2022-04-26 RX ORDER — OXYCODONE AND ACETAMINOPHEN 10; 325 MG/1; MG/1
1 TABLET ORAL ONCE AS NEEDED
Status: COMPLETED | OUTPATIENT
Start: 2022-04-26 | End: 2022-04-26

## 2022-04-26 RX ORDER — SODIUM CHLORIDE 9 MG/ML
100 INJECTION, SOLUTION INTRAVENOUS CONTINUOUS
Status: DISCONTINUED | OUTPATIENT
Start: 2022-04-26 | End: 2022-04-26

## 2022-04-26 RX ORDER — DROPERIDOL 2.5 MG/ML
0.62 INJECTION, SOLUTION INTRAMUSCULAR; INTRAVENOUS ONCE AS NEEDED
Status: DISCONTINUED | OUTPATIENT
Start: 2022-04-26 | End: 2022-04-26 | Stop reason: HOSPADM

## 2022-04-26 RX ORDER — NALOXONE HCL 0.4 MG/ML
0.04 VIAL (ML) INJECTION AS NEEDED
Status: DISCONTINUED | OUTPATIENT
Start: 2022-04-26 | End: 2022-04-26 | Stop reason: HOSPADM

## 2022-04-26 RX ORDER — ONDANSETRON 2 MG/ML
INJECTION INTRAMUSCULAR; INTRAVENOUS AS NEEDED
Status: DISCONTINUED | OUTPATIENT
Start: 2022-04-26 | End: 2022-04-26 | Stop reason: SURG

## 2022-04-26 RX ORDER — DOCUSATE SODIUM 100 MG/1
100 CAPSULE, LIQUID FILLED ORAL 2 TIMES DAILY PRN
Status: DISCONTINUED | OUTPATIENT
Start: 2022-04-26 | End: 2022-04-27 | Stop reason: HOSPADM

## 2022-04-26 RX ORDER — HYDROMORPHONE HYDROCHLORIDE 1 MG/ML
0.5 INJECTION, SOLUTION INTRAMUSCULAR; INTRAVENOUS; SUBCUTANEOUS
Status: DISCONTINUED | OUTPATIENT
Start: 2022-04-26 | End: 2022-04-26 | Stop reason: HOSPADM

## 2022-04-26 RX ORDER — LABETALOL HYDROCHLORIDE 5 MG/ML
5 INJECTION, SOLUTION INTRAVENOUS
Status: DISCONTINUED | OUTPATIENT
Start: 2022-04-26 | End: 2022-04-26 | Stop reason: HOSPADM

## 2022-04-26 RX ORDER — ONDANSETRON 2 MG/ML
4 INJECTION INTRAMUSCULAR; INTRAVENOUS EVERY 6 HOURS PRN
Status: DISCONTINUED | OUTPATIENT
Start: 2022-04-26 | End: 2022-04-27 | Stop reason: HOSPADM

## 2022-04-26 RX ORDER — FLUOXETINE HYDROCHLORIDE 20 MG/1
20 CAPSULE ORAL DAILY
Status: DISCONTINUED | OUTPATIENT
Start: 2022-04-26 | End: 2022-04-27 | Stop reason: HOSPADM

## 2022-04-26 RX ORDER — ONDANSETRON 2 MG/ML
INJECTION INTRAMUSCULAR; INTRAVENOUS AS NEEDED
Status: DISCONTINUED | OUTPATIENT
Start: 2022-04-26 | End: 2022-04-26

## 2022-04-26 RX ORDER — IBUPROFEN 600 MG/1
600 TABLET ORAL ONCE AS NEEDED
Status: DISCONTINUED | OUTPATIENT
Start: 2022-04-26 | End: 2022-04-26 | Stop reason: HOSPADM

## 2022-04-26 RX ORDER — SODIUM CHLORIDE, SODIUM LACTATE, POTASSIUM CHLORIDE, CALCIUM CHLORIDE 600; 310; 30; 20 MG/100ML; MG/100ML; MG/100ML; MG/100ML
1000 INJECTION, SOLUTION INTRAVENOUS CONTINUOUS
Status: DISCONTINUED | OUTPATIENT
Start: 2022-04-26 | End: 2022-04-26

## 2022-04-26 RX ORDER — OXYCODONE HYDROCHLORIDE AND ACETAMINOPHEN 5; 325 MG/1; MG/1
1 TABLET ORAL EVERY 4 HOURS PRN
Status: DISCONTINUED | OUTPATIENT
Start: 2022-04-26 | End: 2022-04-27 | Stop reason: HOSPADM

## 2022-04-26 RX ORDER — MAGNESIUM HYDROXIDE 1200 MG/15ML
LIQUID ORAL AS NEEDED
Status: DISCONTINUED | OUTPATIENT
Start: 2022-04-26 | End: 2022-04-26 | Stop reason: HOSPADM

## 2022-04-26 RX ORDER — DEXTROSE MONOHYDRATE 25 G/50ML
12.5 INJECTION, SOLUTION INTRAVENOUS AS NEEDED
Status: DISCONTINUED | OUTPATIENT
Start: 2022-04-26 | End: 2022-04-26 | Stop reason: HOSPADM

## 2022-04-26 RX ORDER — ROCURONIUM BROMIDE 10 MG/ML
INJECTION, SOLUTION INTRAVENOUS AS NEEDED
Status: DISCONTINUED | OUTPATIENT
Start: 2022-04-26 | End: 2022-04-26 | Stop reason: SURG

## 2022-04-26 RX ORDER — SODIUM CHLORIDE 0.9 % (FLUSH) 0.9 %
10 SYRINGE (ML) INJECTION EVERY 12 HOURS SCHEDULED
Status: DISCONTINUED | OUTPATIENT
Start: 2022-04-26 | End: 2022-04-26 | Stop reason: HOSPADM

## 2022-04-26 RX ORDER — OXYCODONE AND ACETAMINOPHEN 10; 325 MG/1; MG/1
1 TABLET ORAL EVERY 4 HOURS PRN
Status: DISCONTINUED | OUTPATIENT
Start: 2022-04-26 | End: 2022-04-27 | Stop reason: HOSPADM

## 2022-04-26 RX ORDER — ONDANSETRON 2 MG/ML
4 INJECTION INTRAMUSCULAR; INTRAVENOUS AS NEEDED
Status: DISCONTINUED | OUTPATIENT
Start: 2022-04-26 | End: 2022-04-26 | Stop reason: HOSPADM

## 2022-04-26 RX ORDER — NEOSTIGMINE METHYLSULFATE 5 MG/5 ML
SYRINGE (ML) INTRAVENOUS AS NEEDED
Status: DISCONTINUED | OUTPATIENT
Start: 2022-04-26 | End: 2022-04-26 | Stop reason: SURG

## 2022-04-26 RX ORDER — SODIUM CHLORIDE, SODIUM LACTATE, POTASSIUM CHLORIDE, CALCIUM CHLORIDE 600; 310; 30; 20 MG/100ML; MG/100ML; MG/100ML; MG/100ML
50 INJECTION, SOLUTION INTRAVENOUS CONTINUOUS
Status: DISCONTINUED | OUTPATIENT
Start: 2022-04-26 | End: 2022-04-27 | Stop reason: HOSPADM

## 2022-04-26 RX ORDER — DEXAMETHASONE SODIUM PHOSPHATE 4 MG/ML
INJECTION, SOLUTION INTRA-ARTICULAR; INTRALESIONAL; INTRAMUSCULAR; INTRAVENOUS; SOFT TISSUE AS NEEDED
Status: DISCONTINUED | OUTPATIENT
Start: 2022-04-26 | End: 2022-04-26 | Stop reason: SURG

## 2022-04-26 RX ORDER — SODIUM CHLORIDE 0.9 % (FLUSH) 0.9 %
3 SYRINGE (ML) INJECTION AS NEEDED
Status: DISCONTINUED | OUTPATIENT
Start: 2022-04-26 | End: 2022-04-26 | Stop reason: HOSPADM

## 2022-04-26 RX ORDER — ONDANSETRON 4 MG/1
4 TABLET, FILM COATED ORAL EVERY 6 HOURS PRN
Status: DISCONTINUED | OUTPATIENT
Start: 2022-04-26 | End: 2022-04-27 | Stop reason: HOSPADM

## 2022-04-26 RX ORDER — SODIUM CHLORIDE, SODIUM LACTATE, POTASSIUM CHLORIDE, CALCIUM CHLORIDE 600; 310; 30; 20 MG/100ML; MG/100ML; MG/100ML; MG/100ML
9 INJECTION, SOLUTION INTRAVENOUS CONTINUOUS
Status: DISCONTINUED | OUTPATIENT
Start: 2022-04-26 | End: 2022-04-26

## 2022-04-26 RX ORDER — LIDOCAINE HYDROCHLORIDE 20 MG/ML
INJECTION, SOLUTION EPIDURAL; INFILTRATION; INTRACAUDAL; PERINEURAL AS NEEDED
Status: DISCONTINUED | OUTPATIENT
Start: 2022-04-26 | End: 2022-04-26 | Stop reason: SURG

## 2022-04-26 RX ORDER — SODIUM CHLORIDE 0.9 % (FLUSH) 0.9 %
10 SYRINGE (ML) INJECTION AS NEEDED
Status: DISCONTINUED | OUTPATIENT
Start: 2022-04-26 | End: 2022-04-26 | Stop reason: HOSPADM

## 2022-04-26 RX ORDER — ATROPA BELLADONNA AND OPIUM 16.2; 3 MG/1; MG/1
30 SUPPOSITORY RECTAL DAILY PRN
Status: DISCONTINUED | OUTPATIENT
Start: 2022-04-26 | End: 2022-04-27 | Stop reason: HOSPADM

## 2022-04-26 RX ADMIN — VASOPRESSIN 0.5 UNITS: 20 INJECTION INTRAVENOUS at 07:16

## 2022-04-26 RX ADMIN — ROCURONIUM BROMIDE 40 MG: 10 SOLUTION INTRAVENOUS at 07:01

## 2022-04-26 RX ADMIN — CEFAZOLIN SODIUM 2 G: 10 INJECTION, POWDER, FOR SOLUTION INTRAVENOUS at 14:11

## 2022-04-26 RX ADMIN — FLUOXETINE HYDROCHLORIDE 20 MG: 20 CAPSULE ORAL at 12:23

## 2022-04-26 RX ADMIN — OXYCODONE AND ACETAMINOPHEN 1 TABLET: 325; 10 TABLET ORAL at 19:11

## 2022-04-26 RX ADMIN — FENTANYL CITRATE 50 MCG: 50 INJECTION, SOLUTION INTRAMUSCULAR; INTRAVENOUS at 07:39

## 2022-04-26 RX ADMIN — OXYCODONE HYDROCHLORIDE AND ACETAMINOPHEN 1 TABLET: 5; 325 TABLET ORAL at 14:11

## 2022-04-26 RX ADMIN — VASOPRESSIN 0.5 UNITS: 20 INJECTION INTRAVENOUS at 07:44

## 2022-04-26 RX ADMIN — SODIUM CHLORIDE, POTASSIUM CHLORIDE, SODIUM LACTATE AND CALCIUM CHLORIDE 50 ML/HR: 600; 310; 30; 20 INJECTION, SOLUTION INTRAVENOUS at 11:27

## 2022-04-26 RX ADMIN — Medication 5 MG: at 08:02

## 2022-04-26 RX ADMIN — ONDANSETRON 4 MG: 2 INJECTION INTRAMUSCULAR; INTRAVENOUS at 08:00

## 2022-04-26 RX ADMIN — LIDOCAINE HYDROCHLORIDE 100 MG: 20 INJECTION, SOLUTION EPIDURAL; INFILTRATION; INTRACAUDAL at 07:01

## 2022-04-26 RX ADMIN — PROPOFOL 150 MG: 10 INJECTION, EMULSION INTRAVENOUS at 07:01

## 2022-04-26 RX ADMIN — CEFAZOLIN SODIUM 2 G: 10 INJECTION, POWDER, FOR SOLUTION INTRAVENOUS at 22:01

## 2022-04-26 RX ADMIN — SODIUM CHLORIDE, POTASSIUM CHLORIDE, SODIUM LACTATE AND CALCIUM CHLORIDE 1000 ML: 600; 310; 30; 20 INJECTION, SOLUTION INTRAVENOUS at 06:11

## 2022-04-26 RX ADMIN — CEFAZOLIN SODIUM 2 G: 1 INJECTION, POWDER, FOR SOLUTION INTRAMUSCULAR; INTRAVENOUS at 07:07

## 2022-04-26 RX ADMIN — VASOPRESSIN 0.5 UNITS: 20 INJECTION INTRAVENOUS at 07:49

## 2022-04-26 RX ADMIN — OXYCODONE AND ACETAMINOPHEN 1 TABLET: 325; 10 TABLET ORAL at 09:23

## 2022-04-26 RX ADMIN — HYDROMORPHONE HYDROCHLORIDE 0.5 MG: 1 INJECTION, SOLUTION INTRAMUSCULAR; INTRAVENOUS; SUBCUTANEOUS at 08:45

## 2022-04-26 RX ADMIN — VASOPRESSIN 0.5 UNITS: 20 INJECTION INTRAVENOUS at 07:28

## 2022-04-26 RX ADMIN — DEXAMETHASONE SODIUM PHOSPHATE 4 MG: 4 INJECTION, SOLUTION INTRA-ARTICULAR; INTRALESIONAL; INTRAMUSCULAR; INTRAVENOUS; SOFT TISSUE at 07:13

## 2022-04-26 RX ADMIN — GLYCOPYRROLATE 0.8 MG: 0.2 INJECTION, SOLUTION INTRAMUSCULAR; INTRAVENOUS at 08:02

## 2022-04-26 RX ADMIN — FENTANYL CITRATE 25 MCG: 0.05 INJECTION, SOLUTION INTRAMUSCULAR; INTRAVENOUS at 08:28

## 2022-04-26 RX ADMIN — HYDROMORPHONE HYDROCHLORIDE 0.5 MG: 1 INJECTION, SOLUTION INTRAMUSCULAR; INTRAVENOUS; SUBCUTANEOUS at 08:56

## 2022-04-26 RX ADMIN — DEXAMETHASONE SODIUM PHOSPHATE 4 MG: 4 INJECTION, SOLUTION INTRA-ARTICULAR; INTRALESIONAL; INTRAMUSCULAR; INTRAVENOUS; SOFT TISSUE at 07:55

## 2022-04-26 RX ADMIN — NEBIVOLOL HYDROCHLORIDE 10 MG: 5 TABLET ORAL at 12:23

## 2022-04-26 RX ADMIN — FENTANYL CITRATE 50 MCG: 50 INJECTION, SOLUTION INTRAMUSCULAR; INTRAVENOUS at 07:00

## 2022-04-26 RX ADMIN — FENTANYL CITRATE 25 MCG: 0.05 INJECTION, SOLUTION INTRAMUSCULAR; INTRAVENOUS at 08:33

## 2022-04-26 RX ADMIN — PROPOFOL 50 MG: 10 INJECTION, EMULSION INTRAVENOUS at 07:56

## 2022-04-26 NOTE — ANESTHESIA PREPROCEDURE EVALUATION
Anesthesia Evaluation     Patient summary reviewed   no history of anesthetic complications:  NPO Solid Status: > 8 hours             Airway   Mallampati: II  TM distance: >3 FB  Neck ROM: full  Dental      Pulmonary    (-) COPD, asthma, sleep apnea, not a smoker  Cardiovascular   Exercise tolerance: poor (<4 METS) (Limited by back pain  )    (+) hypertension, hyperlipidemia,   (-) pacemaker, past MI, angina, cardiac stents      Neuro/Psych  (-) seizures, TIA, CVA  GI/Hepatic/Renal/Endo    (+) obesity,     (-) GERD, liver disease, no renal disease, diabetes    Musculoskeletal     Abdominal    Substance History      OB/GYN          Other                        Anesthesia Plan    ASA 2     general     intravenous induction     Anesthetic plan, all risks, benefits, and alternatives have been provided, discussed and informed consent has been obtained with: patient.        CODE STATUS:

## 2022-04-26 NOTE — ANESTHESIA PROCEDURE NOTES
Airway  Urgency: elective    Date/Time: 4/26/2022 7:04 AM  Airway not difficult    General Information and Staff    Patient location during procedure: OR  CRNA/CAA: Rose Navarro CRNA    Indications and Patient Condition  Indications for airway management: airway protection    Preoxygenated: yes  Mask difficulty assessment: 2 - vent by mask + OA or adjuvant +/- NMBA    Final Airway Details  Final airway type: endotracheal airway      Successful airway: ETT  Cuffed: yes   Successful intubation technique: direct laryngoscopy  Facilitating devices/methods: intubating stylet  Endotracheal tube insertion site: oral  Blade: Lion  Blade size: 3.5  ETT size (mm): 7.5  Cormack-Lehane Classification: grade IIa - partial view of glottis  Placement verified by: chest auscultation and capnometry   Cuff volume (mL): 8  Measured from: lips  ETT/EBT  to lips (cm): 22  Number of attempts at approach: 1  Assessment: lips, teeth, and gum same as pre-op and atraumatic intubation    Additional Comments  Atraumatic intubation by Tracy Barba SRNA

## 2022-04-26 NOTE — ANESTHESIA POSTPROCEDURE EVALUATION
Patient: Garry Short    Procedure Summary     Date: 04/26/22 Room / Location:  [facility] OR  /  [facility]    Anesthesia Start: 0659 Anesthesia Stop: 0813    Procedure: CYSTOSCOPY TRANSURETHRAL RESECTION OF PROSTATE (N/A Bladder) Diagnosis:       Benign prostatic hyperplasia with urinary obstruction      (Benign prostatic hyperplasia with urinary obstruction [N40.1, N13.8])    Surgeons: Phong Arrieta MD Provider: Rose Navarro CRNA    Anesthesia Type: general ASA Status: 2          Anesthesia Type: general    Vitals  Vitals Value Taken Time   /55 04/26/22 0958   Temp 97.6 °F (36.4 °C) 04/26/22 0921   Pulse 67 04/26/22 0958   Resp 16 04/26/22 0943   SpO2 96 % 04/26/22 0958   Vitals shown include unvalidated device data.        Post Anesthesia Care and Evaluation    PONV Status: none  Comments: Patient d/c from PACU prior to anes eval based on Js score.  Please see RN notes for details of d/c criteria.    Blood pressure 132/60, pulse 69, temperature 97.6 °F (36.4 °C), temperature source Oral, resp. rate 16, SpO2 97 %.

## 2022-04-27 VITALS
OXYGEN SATURATION: 97 % | BODY MASS INDEX: 31.11 KG/M2 | HEIGHT: 71 IN | WEIGHT: 222.22 LBS | HEART RATE: 67 BPM | SYSTOLIC BLOOD PRESSURE: 117 MMHG | DIASTOLIC BLOOD PRESSURE: 57 MMHG | RESPIRATION RATE: 16 BRPM | TEMPERATURE: 98.7 F

## 2022-04-27 LAB
ANION GAP SERPL CALCULATED.3IONS-SCNC: 10 MMOL/L (ref 5–15)
BUN SERPL-MCNC: 14 MG/DL (ref 8–23)
BUN/CREAT SERPL: 14 (ref 7–25)
CALCIUM SPEC-SCNC: 8.7 MG/DL (ref 8.6–10.5)
CHLORIDE SERPL-SCNC: 96 MMOL/L (ref 98–107)
CO2 SERPL-SCNC: 26 MMOL/L (ref 22–29)
CREAT SERPL-MCNC: 1 MG/DL (ref 0.76–1.27)
CYTO UR: NORMAL
DEPRECATED RDW RBC AUTO: 46.5 FL (ref 37–54)
EGFRCR SERPLBLD CKD-EPI 2021: 76.6 ML/MIN/1.73
ERYTHROCYTE [DISTWIDTH] IN BLOOD BY AUTOMATED COUNT: 12.7 % (ref 12.3–15.4)
GLUCOSE SERPL-MCNC: 131 MG/DL (ref 65–99)
HCT VFR BLD AUTO: 34.1 % (ref 37.5–51)
HGB BLD-MCNC: 11.6 G/DL (ref 13–17.7)
LAB AP CASE REPORT: NORMAL
MCH RBC QN AUTO: 33.9 PG (ref 26.6–33)
MCHC RBC AUTO-ENTMCNC: 34 G/DL (ref 31.5–35.7)
MCV RBC AUTO: 99.7 FL (ref 79–97)
PATH REPORT.FINAL DX SPEC: NORMAL
PATH REPORT.GROSS SPEC: NORMAL
PLATELET # BLD AUTO: 189 10*3/MM3 (ref 140–450)
PMV BLD AUTO: 9.7 FL (ref 6–12)
POTASSIUM SERPL-SCNC: 3.7 MMOL/L (ref 3.5–5.2)
RBC # BLD AUTO: 3.42 10*6/MM3 (ref 4.14–5.8)
SODIUM SERPL-SCNC: 132 MMOL/L (ref 136–145)
WBC NRBC COR # BLD: 7.68 10*3/MM3 (ref 3.4–10.8)

## 2022-04-27 PROCEDURE — 63710000001 FLUOXETINE 20 MG CAPSULE: Performed by: UROLOGY

## 2022-04-27 PROCEDURE — 80048 BASIC METABOLIC PNL TOTAL CA: CPT | Performed by: UROLOGY

## 2022-04-27 PROCEDURE — 63710000001 NEBIVOLOL 5 MG TABLET: Performed by: UROLOGY

## 2022-04-27 PROCEDURE — A9270 NON-COVERED ITEM OR SERVICE: HCPCS | Performed by: UROLOGY

## 2022-04-27 PROCEDURE — 85027 COMPLETE CBC AUTOMATED: CPT | Performed by: UROLOGY

## 2022-04-27 RX ORDER — CEPHALEXIN 500 MG/1
500 CAPSULE ORAL 3 TIMES DAILY
Qty: 9 CAPSULE | Refills: 0 | Status: SHIPPED | OUTPATIENT
Start: 2022-04-27 | End: 2022-04-30

## 2022-04-27 RX ADMIN — SODIUM CHLORIDE, POTASSIUM CHLORIDE, SODIUM LACTATE AND CALCIUM CHLORIDE 50 ML/HR: 600; 310; 30; 20 INJECTION, SOLUTION INTRAVENOUS at 05:53

## 2022-04-27 RX ADMIN — FLUOXETINE HYDROCHLORIDE 20 MG: 20 CAPSULE ORAL at 08:29

## 2022-04-27 RX ADMIN — NEBIVOLOL HYDROCHLORIDE 10 MG: 5 TABLET ORAL at 08:29

## 2022-04-29 ENCOUNTER — OFFICE VISIT (OUTPATIENT)
Dept: UROLOGY | Facility: CLINIC | Age: 82
End: 2022-04-29

## 2022-04-29 VITALS — BODY MASS INDEX: 31.08 KG/M2 | TEMPERATURE: 98.2 F | WEIGHT: 222 LBS | HEIGHT: 71 IN

## 2022-04-29 DIAGNOSIS — N13.8 BENIGN PROSTATIC HYPERPLASIA WITH URINARY OBSTRUCTION: Primary | ICD-10-CM

## 2022-04-29 DIAGNOSIS — N40.1 BENIGN PROSTATIC HYPERPLASIA WITH URINARY OBSTRUCTION: Primary | ICD-10-CM

## 2022-04-29 PROCEDURE — 99024 POSTOP FOLLOW-UP VISIT: CPT

## 2022-04-29 NOTE — PROGRESS NOTES
Subjective    Mr. Short is 81 y.o. male    Chief Complaint: Postop TURP catheter removal    History of Present Illness     81-year-old male established patient in for postop TURP catheter removal patient underwent TURP procedure on 4/26/2022 per Dr. Arrieta for history of BPH with incomplete bladder emptying.  At present time patient reports no complaints other than the evening after the procedure patient's Blum catheter was not wanting to drain due to the bulb apparently slipping and catheter being pulled partially out.  Patient reports went to the ER and they were able to fix his catheter that evening and has had no difficulty since.  Patient denies any fever, chills.     The following portions of the patient's history were reviewed and updated as appropriate: allergies, current medications, past family history, past medical history, past social history, past surgical history and problem list.    Review of Systems   Constitutional: Negative for chills, fatigue and fever.   Gastrointestinal: Negative for nausea and vomiting.   Genitourinary: Positive for difficulty urinating. Negative for decreased urine volume, flank pain, frequency, hematuria and urgency.         Current Outpatient Medications:   •  amLODIPine (NORVASC) 10 MG tablet, Take 10 mg by mouth Daily., Disp: , Rfl:   •  Ascorbic Acid (Vitamin C) 500 MG capsule, Take 1 tablet by mouth Daily., Disp: , Rfl:   •  cephalexin (KEFLEX) 500 MG capsule, Take 1 capsule by mouth 3 (Three) Times a Day for 3 days., Disp: 9 capsule, Rfl: 0  •  cyanocobalamin 1000 MCG/ML injection, 1,000 mcg Every 28 (Twenty-Eight) Days., Disp: , Rfl:   •  FLUoxetine (PROzac) 20 MG capsule, Take 20 mg by mouth Daily., Disp: , Rfl:   •  hydrochlorothiazide (HYDRODIURIL) 25 MG tablet, Take 25 mg by mouth Daily., Disp: , Rfl:   •  losartan (COZAAR) 50 MG tablet, Take 100 mg by mouth Daily., Disp: , Rfl:   •  meloxicam (MOBIC) 15 MG tablet, Take 15 mg by mouth Daily., Disp: , Rfl:   •   "nebivolol (BYSTOLIC) 10 MG tablet, Take 10 mg by mouth Daily., Disp: , Rfl:   •  Potassium 99 MG tablet, Take 1 tablet by mouth Daily., Disp: , Rfl:   •  rosuvastatin (CRESTOR) 10 MG tablet, Take 10 mg by mouth., Disp: , Rfl:   •  zolpidem CR (AMBIEN CR) 12.5 MG CR tablet, Take 12.5 mg by mouth At Night As Needed for sleep., Disp: , Rfl:   •  melatonin 5 MG tablet tablet, Take 10 mg by mouth Every Night., Disp: , Rfl:   •  tadalafil (CIALIS) 20 MG tablet, Take 1 tablet by mouth Daily As Needed for Erectile Dysfunction., Disp: 10 tablet, Rfl: 11    Past Medical History:   Diagnosis Date   • Arthritis    • Hyperlipidemia    • Hypertension        Past Surgical History:   Procedure Laterality Date   • BACK SURGERY     • CARPAL TUNNEL RELEASE     • CYSTOSCOPY TRANSURETHRAL RESECTION OF PROSTATE N/A 4/26/2022    Procedure: CYSTOSCOPY TRANSURETHRAL RESECTION OF PROSTATE;  Surgeon: Phong Arrieta MD;  Location: [facility] OR;  Service: Urology;  Laterality: N/A;   • KNEE SURGERY Left    • LAMINECTOMY     • PARTIAL HIP ARTHROPLASTY     • SPINAL CORD STIMULATOR IMPLANT     • SPINAL FUSION     • TOTAL HIP ARTHROPLASTY Left 2018       Social History     Socioeconomic History   • Marital status:    Tobacco Use   • Smoking status: Never Smoker   • Smokeless tobacco: Never Used   Vaping Use   • Vaping Use: Never used   Substance and Sexual Activity   • Alcohol use: Yes     Comment: SCOTCH AND WATER EVERY NIGHT   • Drug use: Never   • Sexual activity: Defer       Family History   Problem Relation Age of Onset   • No Known Problems Father    • No Known Problems Mother        Objective    Temp 98.2 °F (36.8 °C) (Temporal)   Ht 179.5 cm (70.67\")   Wt 101 kg (222 lb)   BMI 31.25 kg/m²     Physical Exam  Constitutional:       Appearance: Normal appearance.   Abdominal:      Tenderness: There is no right CVA tenderness or left CVA tenderness.   Skin:     General: Skin is warm and dry.   Neurological:      Mental Status: He " "is alert and oriented to person, place, and time.   Psychiatric:         Mood and Affect: Mood normal.         Behavior: Behavior normal.             Results for orders placed or performed during the hospital encounter of 04/26/22   CBC (No Diff)    Specimen: Blood   Result Value Ref Range    WBC 7.68 3.40 - 10.80 10*3/mm3    RBC 3.42 (L) 4.14 - 5.80 10*6/mm3    Hemoglobin 11.6 (L) 13.0 - 17.7 g/dL    Hematocrit 34.1 (L) 37.5 - 51.0 %    MCV 99.7 (H) 79.0 - 97.0 fL    MCH 33.9 (H) 26.6 - 33.0 pg    MCHC 34.0 31.5 - 35.7 g/dL    RDW 12.7 12.3 - 15.4 %    RDW-SD 46.5 37.0 - 54.0 fl    MPV 9.7 6.0 - 12.0 fL    Platelets 189 140 - 450 10*3/mm3   Basic Metabolic Panel    Specimen: Blood   Result Value Ref Range    Glucose 131 (H) 65 - 99 mg/dL    BUN 14 8 - 23 mg/dL    Creatinine 1.00 0.76 - 1.27 mg/dL    Sodium 132 (L) 136 - 145 mmol/L    Potassium 3.7 3.5 - 5.2 mmol/L    Chloride 96 (L) 98 - 107 mmol/L    CO2 26.0 22.0 - 29.0 mmol/L    Calcium 8.7 8.6 - 10.5 mg/dL    BUN/Creatinine Ratio 14.0 7.0 - 25.0    Anion Gap 10.0 5.0 - 15.0 mmol/L    eGFR 76.6 >60.0 mL/min/1.73   Tissue Pathology Exam    Specimen: Prostate; Tissue   Result Value Ref Range    Case Report       Surgical Pathology Report                         Case: [id number]                                  Authorizing Provider:  Phong Arrieta MD  Collected:           04/26/2022 06:43 AM          Ordering Location:     [facility] OR  Received:            04/26/2022 08:55 AM          Pathologist:           Boris Bravo MD                                                        Specimen:    Prostate, PROSTATE CHIPS                                                                   Final Diagnosis       Prostate chips, transurethral resection:  Nodular hyperplasia.      Gross Description       1. Prostate.   Received in a formalin filled container labeled with the patient's name, date of birth, and \"prostate chips\".  The specimen consists " of multiple tan-pink, dense and rubbery tissue fragments aggregating to 7.3 x 6.4 x 1.1 cm and weighs 12 g.  External surfaces show smooth tan-pink areas and are otherwise yellow-tan and roughened.  A yellow-brown concretion is identified measuring 0.3 cm in greatest dimension.  Representative sections are submitted in blocks 1A through 1J.          Microscopic Description       Histologic sections show numerous partially cauterized fragments of benign prostate tissue with glandular and stromal hyperplasia.  No malignancy is seen.       Assessment and Plan    Diagnoses and all orders for this visit:    1. Benign prostatic hyperplasia with urinary obstruction (Primary)      81-year-old male established patient in for postop TURP catheter removal patient underwent TURP procedure on 4/26/2022 per Dr. Arrieta for history of BPH with incomplete bladder emptying.      At this time Blum catheter removed by Francis MEDEIROS without difficulty.  Patient tolerated procedure.  Patient was instructed to increase fluid intake throughout the day and to return to our office before closing if unable to urinate.  If after office hours patient was instructed to report to the ER for catheter to be replaced.

## 2022-07-27 ENCOUNTER — OFFICE VISIT (OUTPATIENT)
Dept: UROLOGY | Facility: CLINIC | Age: 82
End: 2022-07-27

## 2022-07-27 VITALS — HEIGHT: 70 IN | WEIGHT: 228.6 LBS | BODY MASS INDEX: 32.73 KG/M2

## 2022-07-27 DIAGNOSIS — N52.9 IMPOTENCE OF ORGANIC ORIGIN: ICD-10-CM

## 2022-07-27 DIAGNOSIS — K40.90 LEFT INGUINAL HERNIA: ICD-10-CM

## 2022-07-27 DIAGNOSIS — N13.8 BENIGN PROSTATIC HYPERPLASIA WITH URINARY OBSTRUCTION: Primary | ICD-10-CM

## 2022-07-27 DIAGNOSIS — A63.0 WARTS, GENITAL: ICD-10-CM

## 2022-07-27 DIAGNOSIS — N40.1 BENIGN PROSTATIC HYPERPLASIA WITH URINARY OBSTRUCTION: Primary | ICD-10-CM

## 2022-07-27 LAB
BILIRUB BLD-MCNC: NEGATIVE MG/DL
CLARITY, POC: CLEAR
COLOR UR: YELLOW
GLUCOSE UR STRIP-MCNC: NEGATIVE MG/DL
KETONES UR QL: NEGATIVE
LEUKOCYTE EST, POC: ABNORMAL
NITRITE UR-MCNC: NEGATIVE MG/ML
PH UR: 7.5 [PH] (ref 5–8)
PROT UR STRIP-MCNC: ABNORMAL MG/DL
RBC # UR STRIP: NEGATIVE /UL
SP GR UR: 1.01 (ref 1–1.03)
UROBILINOGEN UR QL: NORMAL

## 2022-07-27 PROCEDURE — 81003 URINALYSIS AUTO W/O SCOPE: CPT | Performed by: UROLOGY

## 2022-07-27 PROCEDURE — 99214 OFFICE O/P EST MOD 30 MIN: CPT | Performed by: UROLOGY

## 2022-07-27 RX ORDER — IMIQUIMOD 12.5 MG/.25G
CREAM TOPICAL
Qty: 12 EACH | Refills: 1 | Status: SHIPPED | OUTPATIENT
Start: 2022-07-27

## 2022-07-27 NOTE — PROGRESS NOTES
Subjective    Mr. Short is 81 y.o. male    Chief Complaint: BPH follow-up      History of Present Illness  Patient is status post TURP April 26, 2022.  Final pathology revealed 20 g of tissue that was negative for adenocarcinoma the prostate.  Preop AUA symptom score 23/35.  He had failed alpha blockers and finasteride. Patient did not fill out AUA symptom score today, but is happy with the results of surgery.    The following portions of the patient's history were reviewed and updated as appropriate: allergies, current medications, past family history, past medical history, past social history, past surgical history and problem list.    Review of Systems      Current Outpatient Medications:   •  amLODIPine (NORVASC) 10 MG tablet, Take 10 mg by mouth Daily., Disp: , Rfl:   •  Ascorbic Acid (Vitamin C) 500 MG capsule, Take 1 tablet by mouth Daily., Disp: , Rfl:   •  cyanocobalamin 1000 MCG/ML injection, 1,000 mcg Every 28 (Twenty-Eight) Days., Disp: , Rfl:   •  FLUoxetine (PROzac) 20 MG capsule, Take 20 mg by mouth Daily., Disp: , Rfl:   •  hydrochlorothiazide (HYDRODIURIL) 25 MG tablet, Take 25 mg by mouth Daily., Disp: , Rfl:   •  losartan (COZAAR) 50 MG tablet, Take 100 mg by mouth Daily., Disp: , Rfl:   •  melatonin 5 MG tablet tablet, Take 10 mg by mouth Every Night., Disp: , Rfl:   •  meloxicam (MOBIC) 15 MG tablet, Take 15 mg by mouth Daily., Disp: , Rfl:   •  nebivolol (BYSTOLIC) 10 MG tablet, Take 10 mg by mouth Daily., Disp: , Rfl:   •  Potassium 99 MG tablet, Take 1 tablet by mouth Daily., Disp: , Rfl:   •  rosuvastatin (CRESTOR) 10 MG tablet, Take 10 mg by mouth., Disp: , Rfl:   •  tadalafil (CIALIS) 20 MG tablet, Take 1 tablet by mouth Daily As Needed for Erectile Dysfunction., Disp: 10 tablet, Rfl: 11  •  zolpidem CR (AMBIEN CR) 12.5 MG CR tablet, Take 12.5 mg by mouth At Night As Needed for sleep., Disp: , Rfl:   •  imiquimod (Aldara) 5 % cream, Apply to affected area three times weekly, Disp: 12  "each, Rfl: 1    Past Medical History:   Diagnosis Date   • Arthritis    • Hyperlipidemia    • Hypertension        Past Surgical History:   Procedure Laterality Date   • BACK SURGERY     • CARPAL TUNNEL RELEASE     • CYSTOSCOPY TRANSURETHRAL RESECTION OF PROSTATE N/A 4/26/2022    Procedure: CYSTOSCOPY TRANSURETHRAL RESECTION OF PROSTATE;  Surgeon: Phong Arrieta MD;  Location: [facility];  Service: Urology;  Laterality: N/A;   • KNEE SURGERY Left    • LAMINECTOMY     • PARTIAL HIP ARTHROPLASTY     • SPINAL CORD STIMULATOR IMPLANT     • SPINAL FUSION     • TOTAL HIP ARTHROPLASTY Left 2018       Social History     Socioeconomic History   • Marital status:    Tobacco Use   • Smoking status: Never Smoker   • Smokeless tobacco: Never Used   Vaping Use   • Vaping Use: Never used   Substance and Sexual Activity   • Alcohol use: Yes     Comment: SCOTCH AND WATER EVERY NIGHT   • Drug use: Never   • Sexual activity: Defer       Family History   Problem Relation Age of Onset   • No Known Problems Father    • No Known Problems Mother        Objective    Ht 177.8 cm (70\")   Wt 104 kg (228 lb 9.6 oz)   BMI 32.80 kg/m²     Physical Exam  Abdominal:      Comments: + left inguinal hernia   Genitourinary:     Testes: Normal.      Comments: <1cm genital wart shaft of penis            Results for orders placed or performed in visit on 07/27/22   POC Urinalysis Dipstick, Multipro    Specimen: Urine   Result Value Ref Range    Color Yellow Yellow, Straw, Dark Yellow, [place]    Clarity, UA Clear Clear    Glucose, UA Negative Negative mg/dL    Bilirubin Negative Negative    Ketones, UA Negative Negative    Specific Gravity  1.010 1.005 - 1.030    Blood, UA Negative Negative    pH, Urine 7.5 5.0 - 8.0    Protein, POC 30 mg/dL (A) Negative mg/dL    Urobilinogen, UA Normal Normal    Nitrite, UA Negative Negative    Leukocytes Trace (A) Negative     Assessment and Plan    Diagnoses and all orders for this visit:    1. Benign " prostatic hyperplasia with urinary obstruction (Primary)  -     POC Urinalysis Dipstick, Multipro    2. Warts, genital  -     imiquimod (Aldara) 5 % cream; Apply to affected area three times weekly  Dispense: 12 each; Refill: 1    3. Left inguinal hernia          Sp TURP.  FU PRN.    Aldara for genital warts.    Patient will contact PCP for hernia referral.

## (undated) DEVICE — ELECTRD SUPERSECT FRNT LOAD 5PK

## (undated) DEVICE — BAG,DRAINAGE,4L,A/R TOWER,LL,SLIDE TAP: Brand: MEDLINE

## (undated) DEVICE — GLV SURG BIOGEL M LTX PF 7 1/2

## (undated) DEVICE — SYRINGE,PISTON,IRRIGATION,60ML,STERILE: Brand: MEDLINE

## (undated) DEVICE — ST FLD IRR WARM

## (undated) DEVICE — PK CYSTO 30

## (undated) DEVICE — EVAC BLDR UROVAC W ADAPT

## (undated) DEVICE — PK TURNOVER CYSTO RM

## (undated) DEVICE — DOVER HYDROGEL COATED LATEX FOLEY CATHETER, 30 ML, 3-WAY 24 FR/CH (8.0 MM): Brand: DOVER